# Patient Record
Sex: MALE | Race: WHITE | URBAN - METROPOLITAN AREA
[De-identification: names, ages, dates, MRNs, and addresses within clinical notes are randomized per-mention and may not be internally consistent; named-entity substitution may affect disease eponyms.]

---

## 2017-08-09 NOTE — PROGRESS NOTES
SUBJECTIVE:   CC: Ezra Murguia is an 27 year old male who presents for preventative health visit.     Healthy Habits:    Do you get at least three servings of calcium containing foods daily (dairy, green leafy vegetables, etc.)? yes    Amount of exercise or daily activities, outside of work: 2-2 day(s) per week    Problems taking medications regularly not applicable    Medication side effects: No    Have you had an eye exam in the past two years? no    Do you see a dentist twice per year? yes  Do you have sleep apnea, excessive snoring or daytime drowsiness?no     Additional Concerns:     Neck Pain  Duration of complaint: 2 month; tightness in neck muscle.    Denies any injury to neck.  No previous issue with neck.   Works as a . Tried OTC tylenol/ibuprofen.       Patient informed that anything we discuss that is not related to preventative medicine, may be billed for; patient verbalizes understanding.        Today's PHQ-2 Score:   PHQ-2 ( 1999 Pfizer) 8/9/2017   Q1: Little interest or pleasure in doing things 0   Q2: Feeling down, depressed or hopeless 0   PHQ-2 Score 0         Abuse: Current or Past(Physical, Sexual or Emotional)- No  Do you feel safe in your environment - Yes  Social History   Substance Use Topics     Smoking status: Current Every Day Smoker     Types: Cigarettes     Smokeless tobacco: Never Used     Alcohol use Yes     The patient does not drink >3 drinks per day nor >7 drinks per week.    Last PSA: No results found for: PSA    Reviewed orders with patient. Reviewed health maintenance and updated orders accordingly - Yes  Patient Active Problem List   Diagnosis     Elevated blood pressure reading without diagnosis of hypertension     Cervicalgia     Tobacco use     Past Surgical History:   Procedure Laterality Date     BACK SURGERY  02/2016       Social History   Substance Use Topics     Smoking status: Current Every Day Smoker     Types: Cigarettes     Smokeless tobacco: Never  "Used     Alcohol use Yes     Family History   Problem Relation Age of Onset     Colon Cancer No family hx of      Prostate Cancer No family hx of          Current Outpatient Prescriptions   Medication Sig Dispense Refill     cyclobenzaprine (FLEXERIL) 5 MG tablet Take 1 tablet (5 mg) by mouth 3 times daily as needed for muscle spasms Avoid driving or operating machinery while on this medication-has drowsy effects. 42 tablet 0     No Known Allergies      Reviewed and updated as needed this visit by clinical staffTobacco  Allergies  Meds  Soc Hx        Reviewed and updated as needed this visit by Provider        Past Medical History:   Diagnosis Date     Back pain, chronic     s/p back surgery     Tobacco use       Past Surgical History:   Procedure Laterality Date     BACK SURGERY  02/2016       ROS:  C: NEGATIVE for fever, chills, change in weight  I: NEGATIVE for worrisome rashes, moles or lesions  E: NEGATIVE for vision changes or irritation  ENT: NEGATIVE for ear, mouth and throat problems  R: NEGATIVE for significant cough or SOB  CV: NEGATIVE for chest pain, palpitations or peripheral edema  GI: NEGATIVE for nausea, abdominal pain, heartburn, or change in bowel habits   male: negative for dysuria, hematuria, decreased urinary stream, erectile dysfunction, urethral discharge  M: NEGATIVE for significant arthralgias or myalgia  N: NEGATIVE for weakness, dizziness or paresthesias  P: NEGATIVE for changes in mood or affect    OBJECTIVE:   /82  Pulse 78  Temp 97.5  F (36.4  C) (Tympanic)  Ht 6' 2\" (1.88 m)  Wt 170 lb 6.4 oz (77.3 kg)  SpO2 100%  BMI 21.88 kg/m2  EXAM:  GENERAL: healthy, alert and no distress  EYES: Eyes grossly normal to inspection, PERRL and conjunctivae and sclerae normal  HENT: ear canals and TM's normal, nose and mouth without ulcers or lesions  NECK: no adenopathy, no asymmetry, masses, or scars and thyroid normal to palpation  RESP: lungs clear to auscultation - no rales, " rhonchi or wheezes  CV: regular rate and rhythm, normal S1 S2, no S3 or S4, no murmur, click or rub, no peripheral edema and peripheral pulses strong  ABDOMEN: soft, nontender, no hepatosplenomegaly, no masses and bowel sounds normal  MS: no gross musculoskeletal defects noted, no edema  SKIN: no suspicious lesions or rashes  NEURO: Normal strength and tone, mentation intact and speech normal  PSYCH: mentation appears normal, affect normal/bright    ASSESSMENT/PLAN:   Ezra was seen today for physical.    Diagnoses and all orders for this visit:    Routine general medical examination at a health care facility    Cervicalgia  -     cyclobenzaprine (FLEXERIL) 5 MG tablet; Take 1 tablet (5 mg) by mouth 3 times daily as needed for muscle spasms Avoid driving or operating machinery while on this medication-has drowsy effects.  -    If persistent or worsening will obtain imaging studies to further evaluate.   -  Offered PT, patient declined at this time.     Lipid screening  -     Lipid Profile; Future    Screening for diabetes mellitus  -     Glucose; Future    Elevated blood pressure reading without diagnosis of hypertension  Repeat BP at the end of the visit was within goal. Continue to monitor.   Repeat BP check in 2-4 weeks (ancillary)    Tobacco use  Tobacco Cessation Action Plan: Information offered: Patient not interested at this time        COUNSELING:  Reviewed preventive health counseling, as reflected in patient instructions       Regular exercise       Healthy diet/nutrition    BP Screening:   Last 3 BP Readings:    BP Readings from Last 3 Encounters:   08/11/17 132/82       The following was recommended to the patient:  Re-screen within 4 weeks and recommend lifestyle modifications       reports that he has been smoking Cigarettes.  He has never used smokeless tobacco.  Tobacco Cessation Action Plan: Information offered: Patient not interested at this time  Estimated body mass index is 21.88 kg/(m^2) as  "calculated from the following:    Height as of this encounter: 6' 2\" (1.88 m).    Weight as of this encounter: 170 lb 6.4 oz (77.3 kg).         Counseling Resources:  ATP IV Guidelines  Pooled Cohorts Equation Calculator  FRAX Risk Assessment  ICSI Preventive Guidelines  Dietary Guidelines for Americans, 2010  USDA's MyPlate  ASA Prophylaxis  Lung CA Screening    Follow up annually and as needed thoughout the year.    Rachel Lion MD  [facility]  "

## 2017-08-11 ENCOUNTER — OFFICE VISIT (OUTPATIENT)
Dept: FAMILY MEDICINE | Facility: CLINIC | Age: 27
End: 2017-08-11
Payer: COMMERCIAL

## 2017-08-11 VITALS
OXYGEN SATURATION: 100 % | TEMPERATURE: 97.5 F | SYSTOLIC BLOOD PRESSURE: 132 MMHG | HEART RATE: 78 BPM | WEIGHT: 170.4 LBS | HEIGHT: 74 IN | BODY MASS INDEX: 21.87 KG/M2 | DIASTOLIC BLOOD PRESSURE: 82 MMHG

## 2017-08-11 DIAGNOSIS — R03.0 ELEVATED BLOOD PRESSURE READING WITHOUT DIAGNOSIS OF HYPERTENSION: ICD-10-CM

## 2017-08-11 DIAGNOSIS — Z72.0 TOBACCO USE: ICD-10-CM

## 2017-08-11 DIAGNOSIS — Z13.1 SCREENING FOR DIABETES MELLITUS: ICD-10-CM

## 2017-08-11 DIAGNOSIS — Z00.00 ROUTINE GENERAL MEDICAL EXAMINATION AT A HEALTH CARE FACILITY: Primary | ICD-10-CM

## 2017-08-11 DIAGNOSIS — M54.2 CERVICALGIA: ICD-10-CM

## 2017-08-11 DIAGNOSIS — Z13.220 LIPID SCREENING: ICD-10-CM

## 2017-08-11 PROCEDURE — 99213 OFFICE O/P EST LOW 20 MIN: CPT | Mod: 25 | Performed by: FAMILY MEDICINE

## 2017-08-11 PROCEDURE — 99385 PREV VISIT NEW AGE 18-39: CPT | Performed by: FAMILY MEDICINE

## 2017-08-11 RX ORDER — CYCLOBENZAPRINE HCL 5 MG
5 TABLET ORAL 3 TIMES DAILY PRN
Qty: 42 TABLET | Refills: 0 | Status: SHIPPED | OUTPATIENT
Start: 2017-08-11 | End: 2017-09-27

## 2017-08-11 NOTE — NURSING NOTE
"Chief Complaint   Patient presents with     Physical       Initial /85  Pulse 78  Temp 97.5  F (36.4  C) (Tympanic)  Ht 6' 2\" (1.88 m)  Wt 170 lb 6.4 oz (77.3 kg)  SpO2 100%  BMI 21.88 kg/m2 Estimated body mass index is 21.88 kg/(m^2) as calculated from the following:    Height as of this encounter: 6' 2\" (1.88 m).    Weight as of this encounter: 170 lb 6.4 oz (77.3 kg).  Medication Reconciliation: complete     Ebonie Hutchison MA      "

## 2017-08-11 NOTE — LETTER
[facility]  [address]  [place] [address]  [phone]        August 21, 2018    Ezra Murguia  [address]  [place] [address]              Dear Ezra Murguia    This is to remind you that your Lipid profile is due.    You may call our office at [phone] to schedule an appointment.    Please disregard this notice if you have already had your labs drawn or made an appointment.        Sincerely,        Rachel Lion MD

## 2017-08-11 NOTE — MR AVS SNAPSHOT
After Visit Summary   8/11/2017    Ezra Murguia    MRN: [id number]           Patient Information     Date Of Birth          1990        Visit Information        Provider Department      8/11/2017 4:30 PM Rachel Lion MD [facility] [place]        Today's Diagnoses     Routine general medical examination at a health care facility    -  1    Cervicalgia        Lipid screening        Screening for diabetes mellitus        Elevated blood pressure reading without diagnosis of hypertension          Care Instructions      Preventive Health Recommendations  Male Ages 26 - 39    Yearly exam:             See your health care provider every year in order to  o   Review health changes.   o   Discuss preventive care.    o   Review your medicines if your doctor has prescribed any.    You should be tested each year for STDs (sexually transmitted diseases), if you re at risk.     After age 35, talk to your provider about cholesterol testing. If you are at risk for heart disease, have your cholesterol tested at least every 5 years.     If you are at risk for diabetes, you should have a diabetes test (fasting glucose).  Shots: Get a flu shot each year. Get a tetanus shot every 10 years.     Nutrition:    Eat at least 5 servings of fruits and vegetables daily.     Eat whole-grain bread, whole-wheat pasta and brown rice instead of white grains and rice.     Talk to your provider about Calcium and Vitamin D.     Lifestyle    Exercise for at least 150 minutes a week (30 minutes a day, 5 days a week). This will help you control your weight and prevent disease.     Limit alcohol to one drink per day.     No smoking.     Wear sunscreen to prevent skin cancer.     See your dentist every six months for an exam and cleaning.             Follow-ups after your visit        Follow-up notes from your care team     Return in about 2 weeks (around 8/25/2017), or if symptoms worsen or fail to improve, for BP Recheck.     "  Future tests that were ordered for you today     Open Future Orders        Priority Expected Expires Ordered    Lipid Profile Routine  2018    Glucose Routine  2018            Who to contact     Normal or non-critical lab and imaging results will be communicated to you by WoraPayhart, letter or phone within 4 business days after the clinic has received the results. If you do not hear from us within 7 days, please contact the clinic through MyChart or phone. If you have a critical or abnormal lab result, we will notify you by phone as soon as possible.  Submit refill requests through Edsby or call your pharmacy and they will forward the refill request to us. Please allow 3 business days for your refill to be completed.          If you need to speak with a  for additional information , please call: [phone]             Additional Information About Your Visit        Edsby Information     Edsby lets you send messages to your doctor, view your test results, renew your prescriptions, schedule appointments and more. To sign up, go to [email or web address] . Click on \"Log in\" on the left side of the screen, which will take you to the Welcome page. Then click on \"Sign up Now\" on the right side of the page.     You will be asked to enter the access code listed below, as well as some personal information. Please follow the directions to create your username and password.     Your access code is: [email or web address]  Expires: 2017  5:04 PM     Your access code will  in 90 days. If you need help or a new code, please call your [place] clinic or [phone].        Care EveryWhere ID     This is your Care EveryWhere ID. This could be used by other organizations to access your [place] medical records  [id number]        Your Vitals Were     Pulse Temperature Height Pulse Oximetry BMI (Body Mass Index)       78 97.5  F (36.4  C) (Tympanic) 6' 2\" (1.88 m) 100% 21.88 " kg/m2        Blood Pressure from Last 3 Encounters:   08/11/17 146/85    Weight from Last 3 Encounters:   08/11/17 170 lb 6.4 oz (77.3 kg)                 Today's Medication Changes          These changes are accurate as of: 8/11/17  5:04 PM.  If you have any questions, ask your nurse or doctor.               Start taking these medicines.        Dose/Directions    cyclobenzaprine 5 MG tablet   Commonly known as:  FLEXERIL   Used for:  Cervicalgia   Started by:  Rachel Lion MD        Dose:  5 mg   Take 1 tablet (5 mg) by mouth 3 times daily as needed for muscle spasms Avoid driving or operating machinery while on this medication-has drowsy effects.   Quantity:  42 tablet   Refills:  0            Where to get your medicines      Some of these will need a paper prescription and others can be bought over the counter.  Ask your nurse if you have questions.     Bring a paper prescription for each of these medications     cyclobenzaprine 5 MG tablet                Primary Care Provider Office Phone #    [facility] [phone]       No address on file        Equal Access to Services     MARKOS GRAHAM : Hadii og ku hadasho Soomaali, waaxda luqadaha, qaybta kaalmada adeegyada, waxay rajani valencia . So [facility] [phone].    ATENCIÓN: Si habla español, tiene a valdez disposición servicios gratuitos de asistencia lingüística. Llame al [phone].    We comply with applicable federal civil rights laws and [place] laws. We do not discriminate on the basis of race, color, national origin, age, disability sex, sexual orientation or gender identity.            Thank you!     Thank you for choosing [facility]  for your care. Our goal is always to provide you with excellent care. Hearing back from our patients is one way we can continue to improve our services. Please take a few minutes to complete the written survey that you may receive in the mail after your visit with us. Thank  you!             Your Updated Medication List - Protect others around you: Learn how to safely use, store and throw away your medicines at www.disposemymeds.org.          This list is accurate as of: 8/11/17  5:04 PM.  Always use your most recent med list.                   Brand Name Dispense Instructions for use Diagnosis    cyclobenzaprine 5 MG tablet    FLEXERIL    42 tablet    Take 1 tablet (5 mg) by mouth 3 times daily as needed for muscle spasms Avoid driving or operating machinery while on this medication-has drowsy effects.    Cervicalgia

## 2017-09-27 ENCOUNTER — OFFICE VISIT (OUTPATIENT)
Dept: FAMILY MEDICINE | Facility: CLINIC | Age: 27
End: 2017-09-27
Payer: COMMERCIAL

## 2017-09-27 ENCOUNTER — RADIANT APPOINTMENT (OUTPATIENT)
Dept: GENERAL RADIOLOGY | Facility: CLINIC | Age: 27
End: 2017-09-27
Attending: FAMILY MEDICINE
Payer: COMMERCIAL

## 2017-09-27 VITALS
TEMPERATURE: 97.8 F | BODY MASS INDEX: 21.28 KG/M2 | WEIGHT: 165.8 LBS | HEART RATE: 83 BPM | DIASTOLIC BLOOD PRESSURE: 82 MMHG | HEIGHT: 74 IN | OXYGEN SATURATION: 97 % | SYSTOLIC BLOOD PRESSURE: 130 MMHG

## 2017-09-27 DIAGNOSIS — M54.2 CERVICALGIA: Primary | ICD-10-CM

## 2017-09-27 DIAGNOSIS — R03.0 ELEVATED BLOOD PRESSURE READING WITHOUT DIAGNOSIS OF HYPERTENSION: ICD-10-CM

## 2017-09-27 PROCEDURE — 72040 X-RAY EXAM NECK SPINE 2-3 VW: CPT

## 2017-09-27 PROCEDURE — 99214 OFFICE O/P EST MOD 30 MIN: CPT | Performed by: FAMILY MEDICINE

## 2017-09-27 RX ORDER — DICLOFENAC SODIUM 75 MG/1
75 TABLET, DELAYED RELEASE ORAL 2 TIMES DAILY PRN
Qty: 30 TABLET | Refills: 0 | Status: SHIPPED | OUTPATIENT
Start: 2017-09-27 | End: 2017-11-30

## 2017-09-27 RX ORDER — CYCLOBENZAPRINE HCL 10 MG
10 TABLET ORAL 3 TIMES DAILY PRN
Qty: 60 TABLET | Refills: 0 | Status: SHIPPED | OUTPATIENT
Start: 2017-09-27 | End: 2017-11-30

## 2017-09-27 NOTE — MR AVS SNAPSHOT
"              After Visit Summary   2017    Ezra Murguia    MRN: [id number]           Patient Information     Date Of Birth          1990        Visit Information        Provider Department      2017 5:30 PM Rachel Lion MD [facility] [place]        Today's Diagnoses     Cervicalgia    -  1    Elevated blood pressure reading without diagnosis of hypertension           Follow-ups after your visit        Follow-up notes from your care team     Return if symptoms worsen or fail to improve.      Who to contact     Normal or non-critical lab and imaging results will be communicated to you by Wallophart, letter or phone within 4 business days after the clinic has received the results. If you do not hear from us within 7 days, please contact the clinic through Wallophart or phone. If you have a critical or abnormal lab result, we will notify you by phone as soon as possible.  Submit refill requests through [email or web address] or call your pharmacy and they will forward the refill request to us. Please allow 3 business days for your refill to be completed.          If you need to speak with a  for additional information , please call: [phone]             Additional Information About Your Visit        MyCharHotalot Information     [email or web address] lets you send messages to your doctor, view your test results, renew your prescriptions, schedule appointments and more. To sign up, go to [email or web address] . Click on \"Log in\" on the left side of the screen, which will take you to the Welcome page. Then click on \"Sign up Now\" on the right side of the page.     You will be asked to enter the access code listed below, as well as some personal information. Please follow the directions to create your username and password.     Your access code is: [email or web address]  Expires: 2017  5:04 PM     Your access code will  in 90 days. If you need help or a new code, please call your [place] clinic or [phone].   " "     Care EveryWhere ID     This is your Care EveryWhere ID. This could be used by other organizations to access your [place] medical records  [id number]        Your Vitals Were     Pulse Temperature Height Pulse Oximetry BMI (Body Mass Index)       83 97.8  F (36.6  C) (Tympanic) 6' 2\" (1.88 m) 97% 21.29 kg/m2        Blood Pressure from Last 3 Encounters:   09/27/17 147/84   08/11/17 132/82    Weight from Last 3 Encounters:   09/27/17 165 lb 12.8 oz (75.2 kg)   08/11/17 170 lb 6.4 oz (77.3 kg)              We Performed the Following     XR Cervical Spine 2/3 Views          Today's Medication Changes          These changes are accurate as of: 9/27/17  5:47 PM.  If you have any questions, ask your nurse or doctor.               Start taking these medicines.        Dose/Directions    diclofenac 75 MG EC tablet   Commonly known as:  VOLTAREN   Used for:  Cervicalgia   Started by:  Rachel Lion MD        Dose:  75 mg   Take 1 tablet (75 mg) by mouth 2 times daily as needed for moderate pain (take with food)   Quantity:  30 tablet   Refills:  0            Where to get your medicines      These medications were sent to [facility] - [id number]  [id number] [place], [place] [address] [address]     Phone:  [phone]     diclofenac 75 MG EC tablet                Primary Care Provider Office Phone # Fax #    Rachel Lion [phone] [address]  [place] [address]        Equal Access to Services     ASHLEY GRAHAM AH: Hadii og delong hadlottieo Soalma, waaxda luqadaha, qaybta kaalmada rony, beverly richardson. So [facility] [phone].    ATENCIÓN: Si habla español, tiene a valdez disposición servicios gratuitos de asistencia lingüística. Llame al [phone].    We comply with applicable federal civil rights laws and [place] laws. We do not discriminate on the basis of race, color, national origin, age, disability sex, sexual " orientation or gender identity.            Thank you!     Thank you for choosing [facility]  for your care. Our goal is always to provide you with excellent care. Hearing back from our patients is one way we can continue to improve our services. Please take a few minutes to complete the written survey that you may receive in the mail after your visit with us. Thank you!             Your Updated Medication List - Protect others around you: Learn how to safely use, store and throw away your medicines at www.disposemymeds.org.          This list is accurate as of: 9/27/17  5:47 PM.  Always use your most recent med list.                   Brand Name Dispense Instructions for use Diagnosis    cyclobenzaprine 5 MG tablet    FLEXERIL    42 tablet    Take 1 tablet (5 mg) by mouth 3 times daily as needed for muscle spasms Avoid driving or operating machinery while on this medication-has drowsy effects.    Cervicalgia       diclofenac 75 MG EC tablet    VOLTAREN    30 tablet    Take 1 tablet (75 mg) by mouth 2 times daily as needed for moderate pain (take with food)    Cervicalgia

## 2017-09-27 NOTE — NURSING NOTE
"Chief Complaint   Patient presents with     Neck Pain       Initial /84  Pulse 83  Temp 97.8  F (36.6  C) (Tympanic)  Ht 6' 2\" (1.88 m)  Wt 165 lb 12.8 oz (75.2 kg)  SpO2 97%  BMI 21.29 kg/m2 Estimated body mass index is 21.29 kg/(m^2) as calculated from the following:    Height as of this encounter: 6' 2\" (1.88 m).    Weight as of this encounter: 165 lb 12.8 oz (75.2 kg).  Medication Reconciliation: complete     Ebonie Hutchison MA      "

## 2017-09-27 NOTE — PROGRESS NOTES
SUBJECTIVE:   Ezra Murguia is a 27 year old male who presents to clinic today for the following health issues:      Neck Pain  Onset: 3 months    Description:   Location: right side mostly, still having some tightness in left side  Radiation: with stretching he can feel it move to shoulders.     Intensity: 4/10 currently; 7/10 at it's worse     Progression of Symptoms:  Same; feels like a tightness or stiffness in neck.     Accompanying Signs & Symptoms:  Burning, prickly sensation (paresthesias) in arm(s): no   Numbness in arm(s): no   Weakness in arm(s):  no   Fever: no   Headache: no   Nausea and/or vomiting: no     History:   Trauma: no   Previous neck pain: no   Previous surgery or injections: no   Previous Imaging (MRI,X ray): no     Precipitating factors:   Does movement increase the pain:  YES    Alleviating factors:  none    Therapies Tried and outcome:  none      Seen in the clinic recently with similar symptoms, states that the Flexeril was minimally effective.     Problem list and histories reviewed & adjusted, as indicated.  Additional history: as documented    Patient Active Problem List   Diagnosis     Elevated blood pressure reading without diagnosis of hypertension     Cervicalgia     Tobacco use     Past Surgical History:   Procedure Laterality Date     BACK SURGERY  02/2016       Social History   Substance Use Topics     Smoking status: Current Every Day Smoker     Types: Cigarettes     Smokeless tobacco: Never Used     Alcohol use Yes     Family History   Problem Relation Age of Onset     Colon Cancer No family hx of      Prostate Cancer No family hx of          Current Outpatient Prescriptions   Medication Sig Dispense Refill     diclofenac (VOLTAREN) 75 MG EC tablet Take 1 tablet (75 mg) by mouth 2 times daily as needed for moderate pain (take with food) 30 tablet 0     cyclobenzaprine (FLEXERIL) 5 MG tablet Take 1 tablet (5 mg) by mouth 3 times daily as needed for muscle spasms Avoid driving  "or operating machinery while on this medication-has drowsy effects. 42 tablet 0     No Known Allergies      Reviewed and updated as needed this visit by clinical staff     Reviewed and updated as needed this visit by Provider         ROS:  Constitutional, HEENT, cardiovascular, pulmonary, gi and gu systems are negative, except as otherwise noted.      OBJECTIVE:   /82  Pulse 83  Temp 97.8  F (36.6  C) (Tympanic)  Ht 6' 2\" (1.88 m)  Wt 165 lb 12.8 oz (75.2 kg)  SpO2 97%  BMI 21.29 kg/m2  Body mass index is 21.29 kg/(m^2).  GENERAL: healthy, alert and no distress  NECK: no adenopathy, no asymmetry, masses, or scars and thyroid normal to palpation. Tenderness over the right Sternocleidomastoid muscle.    Diagnostic Test Results:  X ray in process    ASSESSMENT/PLAN:     Ezra was seen today for neck pain.    Diagnoses and all orders for this visit:    Cervicalgia, likely cervical strain. Rule out degenerative cervical spine changes.   -     XR Cervical Spine 2/3 Views  -     Start: diclofenac (VOLTAREN) 75 MG EC tablet; Take 1 tablet (75 mg) by mouth 2 times daily as needed for moderate pain (take with food)  - Continue the Flexeril as needed. cyclobenzaprine (FLEXERIL) 10 MG tablet; Take 1 tablet (10 mg) by mouth 3 times daily as needed for muscle spasms Note: has drowsy effects, avoid driving/operating machinery.  -  Declined PT/Chiropractor referral         Elevated blood pressure reading without diagnosis of hypertension  Repeat BP was within goal. Continue to monitor.     Will notify him with X ray results.     Follow up if symptoms fail to improve or worsen.      The patient was in agreement with the plan today and had no questions or concerns prior to leaving the clinic.      Rachel Lion MD  [facility]  "

## 2017-10-03 ENCOUNTER — TELEPHONE (OUTPATIENT)
Dept: FAMILY MEDICINE | Facility: CLINIC | Age: 27
End: 2017-10-03

## 2017-10-03 DIAGNOSIS — M54.2 CERVICALGIA: Primary | ICD-10-CM

## 2017-10-04 NOTE — TELEPHONE ENCOUNTER
Neck X ray was normal.   Pain is likely due cervical strain (muscle strain of the neck muscles)  Recommend Physical Therapy in addition to the the Flexeril and NSAID.   Referral completed.

## 2017-10-04 NOTE — TELEPHONE ENCOUNTER
Spoke with patient, advised xray was read as normal. Patient is still having neck pain wondering what neck step would be. Please advise

## 2017-10-05 NOTE — TELEPHONE ENCOUNTER
Patient returned call, informed as below per Dr. Lion. Patient understood no further questions or concerns

## 2017-11-30 ENCOUNTER — OFFICE VISIT (OUTPATIENT)
Dept: FAMILY MEDICINE | Facility: CLINIC | Age: 27
End: 2017-11-30
Payer: COMMERCIAL

## 2017-11-30 VITALS
SYSTOLIC BLOOD PRESSURE: 139 MMHG | RESPIRATION RATE: 16 BRPM | HEART RATE: 91 BPM | BODY MASS INDEX: 23.1 KG/M2 | OXYGEN SATURATION: 97 % | WEIGHT: 180 LBS | TEMPERATURE: 98 F | DIASTOLIC BLOOD PRESSURE: 85 MMHG | HEIGHT: 74 IN

## 2017-11-30 DIAGNOSIS — M54.50 ACUTE RIGHT-SIDED LOW BACK PAIN WITHOUT SCIATICA: Primary | ICD-10-CM

## 2017-11-30 PROCEDURE — 99213 OFFICE O/P EST LOW 20 MIN: CPT | Performed by: PHYSICIAN ASSISTANT

## 2017-11-30 RX ORDER — METHOCARBAMOL 500 MG/1
500-1000 TABLET, FILM COATED ORAL 3 TIMES DAILY PRN
Qty: 30 TABLET | Refills: 1 | Status: SHIPPED | OUTPATIENT
Start: 2017-11-30 | End: 2018-04-17

## 2017-11-30 RX ORDER — PREDNISONE 20 MG/1
20 TABLET ORAL 2 TIMES DAILY
Qty: 14 TABLET | Refills: 0 | Status: SHIPPED | OUTPATIENT
Start: 2017-11-30 | End: 2017-12-07

## 2017-11-30 NOTE — PROGRESS NOTES
SUBJECTIVE:   Ezra Murguia is a 27 year old male who presents to clinic today for the following health issues:      Back Pain       Duration: yesterday        Specific cause: states lifting something at work    Description:   Location of pain: middle of back right  Character of pain: sharp and dull at rest  Pain radiation:none  New numbness or weakness in legs, not attributed to pain:  no     Intensity: Currently 7/10    History:   Pain interferes with job: YES  History of back problems: recurrent self limited episodes of low back pain in the past also has hx of back sx  Any previous MRI or X-rays: Yes--unsure where.    Sees a specialist for back pain:  No  Therapies tried without relief: heat ice tylenol and ibuprofen     Alleviating factors:   Improved by: nothing      Precipitating factors:  Worsened by: Lifting, Bending, Standing, Sitting, Lying Flat and Walking    Functional and Psychosocial Screen (Britt STarT Back):      Not performed today          Accompanying Signs & Symptoms:  Risk of Fracture:  None  Risk of Cauda Equina:  None  Risk of Infection:  None  Risk of Cancer:  None  Risk of Ankylosing Spondylitis:  Onset at age <35, male, AND morning back stiffness. no                       Problem list and histories reviewed & adjusted, as indicated.  Additional history: as documented        Reviewed and updated as needed this visit by clinical staff  Tobacco  Allergies  Meds       Reviewed and updated as needed this visit by Provider         All other systems negative except as outline above  OBJECTIVE:  BACK: Lumbosacral spine area reveals local tenderness.  Painful and reduced LS ROM noted. Straight leg raise is negative . DTR's, motor strength and sensation normal, including heel and toe gait.  Perifpheral pulses are palpable.  Hipes and knees have full range of motion without pain.  No abdominal tenderness, mass or organomegaly.    Ezra was seen today for back pain.    Diagnoses and all orders for  this visit:    Acute right-sided low back pain without sciatica  -     predniSONE (DELTASONE) 20 MG tablet; Take 1 tablet (20 mg) by mouth 2 times daily for 7 days  -     methocarbamol (ROBAXIN) 500 MG tablet; Take 1-2 tablets (500-1,000 mg) by mouth 3 times daily as needed for muscle spasms      Advised supportive and symptomatic treatment.  Follow up with Provider - if condition persists or worsens.

## 2017-11-30 NOTE — MR AVS SNAPSHOT
"              After Visit Summary   2017    Ezra Murguia    MRN: [id number]           Patient Information     Date Of Birth          1990        Visit Information        Provider Department      2017 3:20 PM Felipe Jhaveri PA-C [facility] [place]        Today's Diagnoses     Acute right-sided low back pain without sciatica    -  1       Follow-ups after your visit        Who to contact     Normal or non-critical lab and imaging results will be communicated to you by [employer], letter or phone within 4 business days after the clinic has received the results. If you do not hear from us within 7 days, please contact the clinic through [employer] or phone. If you have a critical or abnormal lab result, we will notify you by phone as soon as possible.  Submit refill requests through [employer] or call your pharmacy and they will forward the refill request to us. Please allow 3 business days for your refill to be completed.          If you need to speak with a  for additional information , please call: [phone]             Additional Information About Your Visit        [employer] Information     [employer] lets you send messages to your doctor, view your test results, renew your prescriptions, schedule appointments and more. To sign up, go to [email or web address] . Click on \"Log in\" on the left side of the screen, which will take you to the Welcome page. Then click on \"Sign up Now\" on the right side of the page.     You will be asked to enter the access code listed below, as well as some personal information. Please follow the directions to create your username and password.     Your access code is: [email or web address]  Expires: 2018  3:47 PM     Your access code will  in 90 days. If you need help or a new code, please call your [facility] clinic or [phone].        Care EveryWhere ID     This is your Care EveryWhere ID. This could be used by other organizations to access your " "[place] medical records  [id number]        Your Vitals Were     Pulse Temperature Respirations Height Pulse Oximetry BMI (Body Mass Index)    91 98  F (36.7  C) (Tympanic) 16 6' 2\" (1.88 m) 97% 23.11 kg/m2       Blood Pressure from Last 3 Encounters:   11/30/17 139/85   09/27/17 130/82   08/11/17 132/82    Weight from Last 3 Encounters:   11/30/17 180 lb (81.6 kg)   09/27/17 165 lb 12.8 oz (75.2 kg)   08/11/17 170 lb 6.4 oz (77.3 kg)              Today, you had the following     No orders found for display         Today's Medication Changes          These changes are accurate as of: 11/30/17  3:47 PM.  If you have any questions, ask your nurse or doctor.               Start taking these medicines.        Dose/Directions    methocarbamol 500 MG tablet   Commonly known as:  ROBAXIN   Used for:  Acute right-sided low back pain without sciatica   Started by:  Felipe Jhaveri PA-C        Dose:  500-1000 mg   Take 1-2 tablets (500-1,000 mg) by mouth 3 times daily as needed for muscle spasms   Quantity:  30 tablet   Refills:  1       predniSONE 20 MG tablet   Commonly known as:  DELTASONE   Used for:  Acute right-sided low back pain without sciatica   Started by:  Felipe Jhaveri PA-C        Dose:  20 mg   Take 1 tablet (20 mg) by mouth 2 times daily for 7 days   Quantity:  14 tablet   Refills:  0         Stop taking these medicines if you haven't already. Please contact your care team if you have questions.     cyclobenzaprine 10 MG tablet   Commonly known as:  FLEXERIL   Stopped by:  Felipe Jhaveri PA-C           diclofenac 75 MG EC tablet   Commonly known as:  VOLTAREN   Stopped by:  Felipe Jhaveri PA-C                Where to get your medicines      These medications were sent to [facility] [place] - [address]  [address] [facility] [address]     Phone:  [phone]     methocarbamol 500 MG tablet    predniSONE 20 MG tablet                Primary Care Provider " Office Phone # Fax #    Rachel Lion [phone]       [address]  [place] [address]        Equal Access to Services     MARKOS GRAHAM : Jeremiah og delong rustyo Soalma, waaxda luqadaha, qaybta kaalmada rony, beverly lópezjaida debra. So [facility] [phone].    ATENCIÓN: Si habla español, tiene a valdez disposición servicios gratuitos de asistencia lingüística. Llame al [phone].    We comply with applicable federal civil rights laws and [place] laws. We do not discriminate on the basis of race, color, national origin, age, disability, sex, sexual orientation, or gender identity.            Thank you!     Thank you for choosing [facility]  for your care. Our goal is always to provide you with excellent care. Hearing back from our patients is one way we can continue to improve our services. Please take a few minutes to complete the written survey that you may receive in the mail after your visit with us. Thank you!             Your Updated Medication List - Protect others around you: Learn how to safely use, store and throw away your medicines at www.disposemymeds.org.          This list is accurate as of: 11/30/17  3:47 PM.  Always use your most recent med list.                   Brand Name Dispense Instructions for use Diagnosis    methocarbamol 500 MG tablet    ROBAXIN    30 tablet    Take 1-2 tablets (500-1,000 mg) by mouth 3 times daily as needed for muscle spasms    Acute right-sided low back pain without sciatica       predniSONE 20 MG tablet    DELTASONE    14 tablet    Take 1 tablet (20 mg) by mouth 2 times daily for 7 days    Acute right-sided low back pain without sciatica

## 2017-11-30 NOTE — LETTER
[facility]  [address]  [place] [address]  Phone: [phone]    November 30, 2017        Ezra Murguia  [address] [facility] [address]          To whom it may concern:    RE: Ezra Murguia    Patient was seen and treated today at our clinic. Please excuse him from work on 12/1/17.    Please contact me for questions or concerns.      Sincerely,        Felipe Jhaveri PA-C

## 2018-02-22 ENCOUNTER — OFFICE VISIT (OUTPATIENT)
Dept: OTOLARYNGOLOGY | Facility: CLINIC | Age: 28
End: 2018-02-22
Payer: COMMERCIAL

## 2018-02-22 VITALS — WEIGHT: 175 LBS | TEMPERATURE: 98.6 F | BODY MASS INDEX: 22.46 KG/M2 | HEIGHT: 74 IN

## 2018-02-22 DIAGNOSIS — J35.01 CHRONIC TONSILLITIS: Primary | ICD-10-CM

## 2018-02-22 PROCEDURE — 99204 OFFICE O/P NEW MOD 45 MIN: CPT | Performed by: OTOLARYNGOLOGY

## 2018-02-22 NOTE — PATIENT INSTRUCTIONS
General Scheduling Information  To schedule your CT/MRI scan, please contact [facility] at [phone] OR [facility] at [phone]    To schedule your Surgery, please contact our Specialty Schedulers at [phone]      ENT Clinic Locations Clinic Hours Telephone Number     [facility]  [address]  [place] [address]     2nd & 4th Thursday:           8:00am - 12:00pm   To schedule/reschedule an appointment with   Dr. Tavares,   please contact our   Specialty Scheduling Department at:     [phone]       [facility]  [address] [place] [address] [place] [address]   Monday:             8:00am -- 4:30 pm      1st, 3rd & 5th Thursday:           8:00am - 12:00pm      Jason [address] [address]   Wednesday:       9:00 -- 4:30 pm

## 2018-02-22 NOTE — PROGRESS NOTES
"ENT Consultation    History of Present Illness - Ezra Murguia is a 27 year old male with tonsil stones. Has had chronic tonsil issues since November. Has a sensation of a foreign body in the back of the throat. Has tried, gargling and water pick with no benefit.     Past Medical History -   Past Medical History:   Diagnosis Date     Back pain, chronic     s/p back surgery     Tobacco use        Current Medications -   Current Outpatient Prescriptions:      methocarbamol (ROBAXIN) 500 MG tablet, Take 1-2 tablets (500-1,000 mg) by mouth 3 times daily as needed for muscle spasms, Disp: 30 tablet, Rfl: 1    Allergies - No Known Allergies    Social History -   Social History     Social History     Marital status: Single     Spouse name: N/A     Number of children: N/A     Years of education: N/A     Social History Main Topics     Smoking status: Current Every Day Smoker     Types: Cigarettes     Smokeless tobacco: Never Used     Alcohol use Yes     Drug use: No     Sexual activity: Yes     Partners: Female     Birth control/ protection: Pill     Other Topics Concern     None     Social History Narrative       Family History -   Family History   Problem Relation Age of Onset     Colon Cancer No family hx of      Prostate Cancer No family hx of        Review of Systems - As per HPI and PMHx, otherwise review of system review of the head and neck negative.    This document serves as a record of the services and decisions personally performed and made by Joesph Tavares MD. It was created on his behalf by Joe Brooks, a trained medical scribe. The creation of this document is based the provider's statements to the medical scribe.  Joe Brooks February 22, 2018 4:15 PM     Physical Exam  Temp 98.6  F (37  C) (Tympanic)  Ht 1.88 m (6' 2\")  Wt 79.4 kg (175 lb)  BMI 22.47 kg/m2  BMI: Body mass index is 22.47 kg/(m^2).    General - The patient is well nourished and well developed, and appears to have good " nutritional status.  Alert and oriented to person and place, answers questions and cooperates with examination appropriately.    Skin - No suspicious lesions or rashes.  Respiration - No respiratory distress.  Head and Face - Normocephalic and atraumatic, with no gross asymmetry noted of the contour of the facial features.  The facial nerve is intact, with strong symmetric movements.    Voice and Breathing - The patient was breathing comfortably without the use of accessory muscles. There was no wheezing, stridor, or stertor.  The patients voice was clear and strong, and had appropriate pitch and quality.    Ears - Bilateral pinna and EACs with normal appearing overlying skin. Tympanic membrane intact with good mobility on pneumatic otoscopy bilaterally. Bony landmarks of the ossicular chain are normal. The tympanic membranes are normal in appearance. No retraction, perforation, or masses.  No fluid or purulence was seen in the external canal or the middle ear.     Eyes - Extraocular movements intact.  Sclera were not icteric or injected, conjunctiva were pink and moist.    Mouth - Examination of the oral cavity showed pink, healthy oral mucosa. No lesions or ulcerations noted.  The tongue was mobile and midline, and the dentition were in good condition.      Throat - The walls of the oropharynx were smooth, pink, moist, symmetric, and had no lesions or ulcerations.  The tonsillar pillars and soft palate were symmetric.  The uvula was midline on elevation. 2 + tonsils with multiple crypts and stones.  They appear fairly red.      Neck - Normal midline excursion of the laryngotracheal complex during swallowing.  Full range of motion on passive movement.  Palpation of the occipital, submental, submandibular, internal jugular chain, and supraclavicular nodes did not demonstrate any abnormal lymph nodes or masses.  The carotid pulse was palpable bilaterally.  Palpation of the thyroid was soft and smooth, with no nodules  or goiter appreciated.  The trachea was mobile and midline.    Nose - External contour is symmetric, no gross deflection or scars.  Nasal mucosa is pink and moist with no abnormal mucus.  The septum was midline and non-obstructive, turbinates of normal size and position.  No polyps, masses, or purulence noted on examination.    Neuro - Nonfocal neuro exam is normal, CN 2 through 12 intact, normal gait and muscle tone.    Performed in clinic today:  No procedures preformed in clinic today      A/P - Ezra Murguia is a 27 year old male with chronic tonsillitis and tonsilloliths.       Based on the physical exam and history, my recommendation is for tonsillectomy (with or without adenoidectomy).  The remainder of the visit was spent discussing the risks and benefits of tonsillectomy.      These included:The risks of general anesthesia, bleeding, infection, possible need for emergency surgery to control bleeding, possible alteration of speech and swallowing, and even the possibility of continued throat problems following surgery.They understood and wished to call in and schedule. To be scheduled in [place]    Joesph Tavares MD    The information in this document, created by the medical scribe for me, accurately reflects the services I personally performed and the decisions made by me. I have reviewed and approved this document for accuracy prior to leaving the patient care area.  Joesph Tavares MD  4:15 PM, 02/22/18

## 2018-02-22 NOTE — LETTER
2/22/2018         RE: Ezra Murguia  [address]  [facility] [address]        Dear Colleague,    Thank you for referring your patient, Ezra Murguia, to the [facility]. Please see a copy of my visit note below.    ENT Consultation    History of Present Illness - Ezra Murguia is a 27 year old male with tonsil stones. Has had chronic tonsil issues since November. Has a sensation of a foreign body in the back of the throat. Has tried, gargling and water pick with no benefit.     Past Medical History -   Past Medical History:   Diagnosis Date     Back pain, chronic     s/p back surgery     Tobacco use        Current Medications -   Current Outpatient Prescriptions:      methocarbamol (ROBAXIN) 500 MG tablet, Take 1-2 tablets (500-1,000 mg) by mouth 3 times daily as needed for muscle spasms, Disp: 30 tablet, Rfl: 1    Allergies - No Known Allergies    Social History -   Social History     Social History     Marital status: Single     Spouse name: N/A     Number of children: N/A     Years of education: N/A     Social History Main Topics     Smoking status: Current Every Day Smoker     Types: Cigarettes     Smokeless tobacco: Never Used     Alcohol use Yes     Drug use: No     Sexual activity: Yes     Partners: Female     Birth control/ protection: Pill     Other Topics Concern     None     Social History Narrative       Family History -   Family History   Problem Relation Age of Onset     Colon Cancer No family hx of      Prostate Cancer No family hx of        Review of Systems - As per HPI and PMHx, otherwise review of system review of the head and neck negative.    This document serves as a record of the services and decisions personally performed and made by Joesph Tavares MD. It was created on his behalf by Joe Brooks, a trained medical scribe. The creation of this document is based the provider's statements to the medical scribe.  Joe Brooks February 22, 2018 4:15 PM     Physical  "Exam  Temp 98.6  F (37  C) (Tympanic)  Ht 1.88 m (6' 2\")  Wt 79.4 kg (175 lb)  BMI 22.47 kg/m2  BMI: Body mass index is 22.47 kg/(m^2).    General - The patient is well nourished and well developed, and appears to have good nutritional status.  Alert and oriented to person and place, answers questions and cooperates with examination appropriately.    Skin - No suspicious lesions or rashes.  Respiration - No respiratory distress.  Head and Face - Normocephalic and atraumatic, with no gross asymmetry noted of the contour of the facial features.  The facial nerve is intact, with strong symmetric movements.    Voice and Breathing - The patient was breathing comfortably without the use of accessory muscles. There was no wheezing, stridor, or stertor.  The patients voice was clear and strong, and had appropriate pitch and quality.    Ears - Bilateral pinna and EACs with normal appearing overlying skin. Tympanic membrane intact with good mobility on pneumatic otoscopy bilaterally. Bony landmarks of the ossicular chain are normal. The tympanic membranes are normal in appearance. No retraction, perforation, or masses.  No fluid or purulence was seen in the external canal or the middle ear.     Eyes - Extraocular movements intact.  Sclera were not icteric or injected, conjunctiva were pink and moist.    Mouth - Examination of the oral cavity showed pink, healthy oral mucosa. No lesions or ulcerations noted.  The tongue was mobile and midline, and the dentition were in good condition.      Throat - The walls of the oropharynx were smooth, pink, moist, symmetric, and had no lesions or ulcerations.  The tonsillar pillars and soft palate were symmetric.  The uvula was midline on elevation. 2 + tonsils with multiple crypts and stones.  They appear fairly red.      Neck - Normal midline excursion of the laryngotracheal complex during swallowing.  Full range of motion on passive movement.  Palpation of the occipital, submental, " submandibular, internal jugular chain, and supraclavicular nodes did not demonstrate any abnormal lymph nodes or masses.  The carotid pulse was palpable bilaterally.  Palpation of the thyroid was soft and smooth, with no nodules or goiter appreciated.  The trachea was mobile and midline.    Nose - External contour is symmetric, no gross deflection or scars.  Nasal mucosa is pink and moist with no abnormal mucus.  The septum was midline and non-obstructive, turbinates of normal size and position.  No polyps, masses, or purulence noted on examination.    Neuro - Nonfocal neuro exam is normal, CN 2 through 12 intact, normal gait and muscle tone.    Performed in clinic today:  No procedures preformed in clinic today      A/P - Ezra Murguia is a 27 year old male with chronic tonsillitis and tonsilloliths.       Based on the physical exam and history, my recommendation is for tonsillectomy (with or without adenoidectomy).  The remainder of the visit was spent discussing the risks and benefits of tonsillectomy.      These included:The risks of general anesthesia, bleeding, infection, possible need for emergency surgery to control bleeding, possible alteration of speech and swallowing, and even the possibility of continued throat problems following surgery.They understood and wished to call in and schedule. To be scheduled in [place]    Joesph Tavares MD    The information in this document, created by the medical scribe for me, accurately reflects the services I personally performed and the decisions made by me. I have reviewed and approved this document for accuracy prior to leaving the patient care area.  Joesph Tavares MD  4:15 PM, 02/22/18    Again, thank you for allowing me to participate in the care of your patient.        Sincerely,        Joesph Tavares MD, MD

## 2018-02-22 NOTE — MR AVS SNAPSHOT
After Visit Summary   2/22/2018    Ezra Murguia    MRN: [id number]           Patient Information     Date Of Birth          1990        Visit Information        Provider Department      2/22/2018 3:30 PM Joesph Tavares MD [facility]        Today's Diagnoses     Chronic tonsillitis    -  1      Care Instructions    General Scheduling Information  To schedule your CT/MRI scan, please contact [facility] at [phone] OR [facility] at [phone]    To schedule your Surgery, please contact our Specialty Schedulers at [phone]      ENT Clinic Locations Clinic Hours Telephone Number     [facility] [place]  [address]  [place] [address]     2nd & 4th Thursday:           8:00am - 12:00pm   To schedule/reschedule an appointment with   Dr. Tavares,   please contact our   Specialty Scheduling Department at:     [phone]       [facility]  [address] [place] [address] [place] [address]   Monday:             8:00am -- 4:30 pm      1st, 3rd & 5th Thursday:           8:00am - 12:00pm      [address] [address]   Wednesday:       9:00 -- 4:30 pm                    Follow-ups after your visit        Who to contact     If you have questions or need follow up information about today's clinic visit or your schedule please contact [facility] directly at [phone].  Normal or non-critical lab and imaging results will be communicated to you by MyChart, letter or phone within 4 business days after the clinic has received the results. If you do not hear from us within 7 days, please contact the clinic through MyChart or phone. If you have a critical or abnormal lab result, we will notify you by phone as soon as possible.  Submit refill requests through HipSwap or call your pharmacy and they will forward the refill request to us. Please allow 3 business days for your refill to be completed.          Additional  "Information About Your Visit        MyChart Information     Topicmarks lets you send messages to your doctor, view your test results, renew your prescriptions, schedule appointments and more. To sign up, go to [email or web address] . Click on \"Log in\" on the left side of the screen, which will take you to the Welcome page. Then click on \"Sign up Now\" on the right side of the page.     You will be asked to enter the access code listed below, as well as some personal information. Please follow the directions to create your username and password.     Your access code is: [email or web address]  Expires: 2018  3:47 PM     Your access code will  in 90 days. If you need help or a new code, please call your [place] clinic or [phone].        Care EveryWhere ID     This is your Care EveryWhere ID. This could be used by other organizations to access your [place] medical records  [id number]        Your Vitals Were     Temperature Height BMI (Body Mass Index)             98.6  F (37  C) (Tympanic) 1.88 m (6' 2\") 22.47 kg/m2          Blood Pressure from Last 3 Encounters:   17 139/85   17 130/82   17 132/82    Weight from Last 3 Encounters:   18 79.4 kg (175 lb)   17 81.6 kg (180 lb)   17 75.2 kg (165 lb 12.8 oz)              Today, you had the following     No orders found for display       Primary Care Provider Office Phone # Fax #    Rachel Lion [phone]       [address] [place] [address]        Equal Access to Services     [facility]: Hadii og Cook, waminda ludaphneadaha, qaybta kaalmada rony, beverly richardson. So [facility] [phone].    ATENCIÓN: Si habla español, tiene a valdez disposición servicios gratuitos de asistencia lingüística. Llame al [phone].    We comply with applicable federal civil rights laws and [place] laws. We do not discriminate on the basis of race, color, national origin, age, " disability, sex, sexual orientation, or gender identity.            Thank you!     Thank you for choosing [facility]  for your care. Our goal is always to provide you with excellent care. Hearing back from our patients is one way we can continue to improve our services. Please take a few minutes to complete the written survey that you may receive in the mail after your visit with us. Thank you!             Your Updated Medication List - Protect others around you: Learn how to safely use, store and throw away your medicines at www.disposemymeds.org.          This list is accurate as of 2/22/18  6:29 PM.  Always use your most recent med list.                   Brand Name Dispense Instructions for use Diagnosis    methocarbamol 500 MG tablet    ROBAXIN    30 tablet    Take 1-2 tablets (500-1,000 mg) by mouth 3 times daily as needed for muscle spasms    Acute right-sided low back pain without sciatica

## 2018-02-23 ENCOUNTER — TELEPHONE (OUTPATIENT)
Dept: OTOLARYNGOLOGY | Facility: CLINIC | Age: 28
End: 2018-02-23

## 2018-02-23 NOTE — TELEPHONE ENCOUNTER
Left message for return call to schedule surgery        Medical Record Number: [id number]   Ezra Murguia   YOB: 1990   Phone: [phone] (home)   Primary Provider: Rachel Lion     Diagnosis and ICD-10 Code:   Chronic Tonsillitis (J35.01)     Surgeon: MEGAN Tavares MD   Surgical Procedure: Tonsillectomy   BMI:  23   Consent signed? No    Date signed: N/A   Hospital: [facility]   In-Patient/ Out-Patient status: Outpatient   Length of surgery: 30 Minutes   Anesthesia: GEN   Implanted Cardiac Device: No     Date of Surgery: To be determined/per patient   When post-op appointment needed: 2 Weeks     Special Requests/Equipment:

## 2018-02-23 NOTE — TELEPHONE ENCOUNTER
Surgery Scheduled    Date of Surgery 4/24   Procedure: tonsillectomy  [facility]  Surgeon: Chaitanya  Type of Anesthesia : General  Pre-op Patient calling [facility]  2 week post op:5/10 with Dr. Chaitanya [place]        Surgery packet mailed to patient's home address. Patients instructed to arrive 1 hours prior to surgery. Patient understood and agrees to plan.    Glory Meléndez  Surgical Scheduler

## 2018-04-17 ENCOUNTER — OFFICE VISIT (OUTPATIENT)
Dept: FAMILY MEDICINE | Facility: CLINIC | Age: 28
End: 2018-04-17
Payer: COMMERCIAL

## 2018-04-17 VITALS
TEMPERATURE: 97.5 F | BODY MASS INDEX: 23.72 KG/M2 | SYSTOLIC BLOOD PRESSURE: 158 MMHG | HEART RATE: 74 BPM | HEIGHT: 74 IN | DIASTOLIC BLOOD PRESSURE: 97 MMHG | OXYGEN SATURATION: 98 % | WEIGHT: 184.8 LBS

## 2018-04-17 DIAGNOSIS — Z01.818 PREOP GENERAL PHYSICAL EXAM: Primary | ICD-10-CM

## 2018-04-17 DIAGNOSIS — J35.01 CHRONIC TONSILLITIS: ICD-10-CM

## 2018-04-17 DIAGNOSIS — R03.0 ELEVATED BLOOD PRESSURE READING WITHOUT DIAGNOSIS OF HYPERTENSION: ICD-10-CM

## 2018-04-17 LAB
ANION GAP SERPL CALCULATED.3IONS-SCNC: 6 MMOL/L (ref 3–14)
BUN SERPL-MCNC: 11 MG/DL (ref 7–30)
CALCIUM SERPL-MCNC: 9.2 MG/DL (ref 8.5–10.1)
CHLORIDE SERPL-SCNC: 103 MMOL/L (ref 94–109)
CO2 SERPL-SCNC: 28 MMOL/L (ref 20–32)
CREAT SERPL-MCNC: 0.87 MG/DL (ref 0.66–1.25)
ERYTHROCYTE [DISTWIDTH] IN BLOOD BY AUTOMATED COUNT: 13 % (ref 10–15)
GFR SERPL CREATININE-BSD FRML MDRD: >90 ML/MIN/1.7M2
GLUCOSE SERPL-MCNC: 85 MG/DL (ref 70–99)
HCT VFR BLD AUTO: 42.2 % (ref 40–53)
HGB BLD-MCNC: 14.3 G/DL (ref 13.3–17.7)
MCH RBC QN AUTO: 29.1 PG (ref 26.5–33)
MCHC RBC AUTO-ENTMCNC: 33.9 G/DL (ref 31.5–36.5)
MCV RBC AUTO: 86 FL (ref 78–100)
PLATELET # BLD AUTO: 214 10E9/L (ref 150–450)
POTASSIUM SERPL-SCNC: 4.4 MMOL/L (ref 3.4–5.3)
RBC # BLD AUTO: 4.92 10E12/L (ref 4.4–5.9)
SODIUM SERPL-SCNC: 137 MMOL/L (ref 133–144)
WBC # BLD AUTO: 6.2 10E9/L (ref 4–11)

## 2018-04-17 PROCEDURE — 85027 COMPLETE CBC AUTOMATED: CPT | Performed by: FAMILY MEDICINE

## 2018-04-17 PROCEDURE — 99214 OFFICE O/P EST MOD 30 MIN: CPT | Performed by: FAMILY MEDICINE

## 2018-04-17 PROCEDURE — 80048 BASIC METABOLIC PNL TOTAL CA: CPT | Performed by: FAMILY MEDICINE

## 2018-04-17 PROCEDURE — 36415 COLL VENOUS BLD VENIPUNCTURE: CPT | Performed by: FAMILY MEDICINE

## 2018-04-17 NOTE — LETTER
April 19, 2018      Ezra Murguia  [address]  [place] [address]        Dear ,    We are writing to inform you of your test results.    Your Pre-op labs were normal.     Resulted Orders   CBC with platelets   Result Value Ref Range    WBC 6.2 4.0 - 11.0 10e9/L    RBC Count 4.92 4.4 - 5.9 10e12/L    Hemoglobin 14.3 13.3 - 17.7 g/dL    Hematocrit 42.2 40.0 - 53.0 %    MCV 86 78 - 100 fl    MCH 29.1 26.5 - 33.0 pg    MCHC 33.9 31.5 - 36.5 g/dL    RDW 13.0 10.0 - 15.0 %    Platelet Count 214 150 - 450 10e9/L   Basic metabolic panel   Result Value Ref Range    Sodium 137 133 - 144 mmol/L    Potassium 4.4 3.4 - 5.3 mmol/L    Chloride 103 94 - 109 mmol/L    Carbon Dioxide 28 20 - 32 mmol/L    Anion Gap 6 3 - 14 mmol/L    Glucose 85 70 - 99 mg/dL    Urea Nitrogen 11 7 - 30 mg/dL    Creatinine 0.87 0.66 - 1.25 mg/dL    GFR Estimate >90 >60 mL/min/1.7m2      Comment:      Non  GFR Calc    GFR Estimate If Black >90 >60 mL/min/1.7m2      Comment:       GFR Calc    Calcium 9.2 8.5 - 10.1 mg/dL       If you have any questions or concerns, please call the clinic at the number listed above.       Sincerely,        Rachel Lion MD/abiel

## 2018-04-17 NOTE — PATIENT INSTRUCTIONS
Before Your Surgery      Call your surgeon if there is any change in your health. This includes signs of a cold or flu (such as a sore throat, runny nose, cough, rash or fever).    Do not smoke, drink alcohol or take over the counter medicine (unless your surgeon or primary care doctor tells you to) for the 24 hours before and after surgery.    If you take prescribed drugs: Follow your doctor s orders about which medicines to take and which to stop until after surgery.    Eating and drinking prior to surgery: follow the instructions from your surgeon    Take a shower or bath the night before surgery. Use the soap your surgeon gave you to gently clean your skin. If you do not have soap from your surgeon, use your regular soap. Do not shave or scrub the surgery site.  Wear clean pajamas and have clean sheets on your bed.     Pre-operative medication management   1.Avoid over the counter vitamins and Fish Oils, Asprin/NSAIDS at least 7-10 days prior to surgery.  2. May take Tylenol as needed for pain/discomfort prior to surgery

## 2018-04-17 NOTE — PROGRESS NOTES
[facility] [place]  [address]  [place] [address]  [phone]  Dept: [phone]    PRE-OP EVALUATION:  Today's date: 2018    Ezra Murguia (: 1990) presents for pre-operative evaluation assessment as requested by Dr. Tavares.  He requires evaluation and anesthesia risk assessment prior to undergoing surgery/procedure for treatment of chronic tonsillitis .    Proposed Surgery/ Procedure: Tonsillectomy  Date of Surgery/ Procedure: 18  Time of Surgery/ Procedure: 9:30 AM  Hospital/Surgical Facility: [facility]  Fax number for surgical facility: N/A  Primary Physician: Rachel Lion MD  Type of Anesthesia Anticipated: General    Patient has a Health Care Directive or Living Will:  NO    1. NO - Do you have a history of heart attack, stroke, stent, bypass or surgery on an artery in the head, neck, heart or legs?  2. NO - Do you ever have any pain or discomfort in your chest?  3. NO - Do you have a history of  Heart Failure?  4. NO - Are you troubled by shortness of breath when: walking on the level, up a slight hill or at night?  5. NO - Do you currently have a cold, bronchitis or other respiratory infection?  6. NO - Do you have a cough, shortness of breath or wheezing?  7. NO - Do you sometimes get pains in the calves of your legs when you walk?  8. NO - Do you or anyone in your family have previous history of blood clots?  9. YES - DO YOU OR DOES ANYONE IN YOUR FAMILY HAVE A SERIOUS BLEEDING PROBLEM SUCH AS PROLONGED BLEEDING FOLLOWING SURGERIES OR CUTS? Brother   10. NO - Have you ever had problems with anemia or been told to take iron pills?  11. NO - Have you had any abnormal blood loss such as black, tarry or bloody stools, or abnormal vaginal bleeding?  12. NO - Have you ever had a blood transfusion?  13. NO - Have you or any of your relatives ever had problems with anesthesia?  14. NO - Do you have sleep apnea, excessive snoring or  daytime drowsiness?  15. NO - Do you have any prosthetic heart valves?  16. NO - Do you have prosthetic joints?  17. NO - Is there any chance that you may be pregnant?      HPI:     HPI related to upcoming procedure:     28 year old pleasant male that is here for a pre-op visit.   Was recently seen at the ENT's office for Chronic tonsillitis and tonsilliths. He agreed to proceed with a Tonsillectomy +/-adenoidectomy.   He states that he understands the risks and benefits of the procedure and wishes to proceed.    Blood pressure is elevated in the clinic today, 158/97.  He has no known history of hypertension.    His feels well and has no chronic medical problems.  Recent illnesses.      See problem list for active medical problems.  Problems all longstanding and stable, except as noted/documented.  See ROS for pertinent symptoms related to these conditions.                                                                                                                                                          .    MEDICAL HISTORY:     Patient Active Problem List    Diagnosis Date Noted     Chronic tonsillitis 04/17/2018     Priority: Medium     Elevated blood pressure reading without diagnosis of hypertension 08/11/2017     Priority: Medium     Cervicalgia 08/11/2017     Priority: Medium     Tobacco use 08/11/2017     Priority: Medium      Past Medical History:   Diagnosis Date     Back pain, chronic     s/p back surgery     Chronic tonsillitis      Tobacco use     quit in 1/2018     Past Surgical History:   Procedure Laterality Date     BACK SURGERY  02/2016     HC TOOTH EXTRACTION W/FORCEP       No current outpatient prescriptions on file.     OTC products: None, except as noted above    No Known Allergies   Latex Allergy: NO    Social History   Substance Use Topics     Smoking status: Former Smoker     Types: Cigarettes     Smokeless tobacco: Never Used     Alcohol use Yes      Comment: occ     History   Drug Use  "No       REVIEW OF SYSTEMS:   Constitutional, neuro, ENT, endocrine, pulmonary, cardiac, gastrointestinal, genitourinary, musculoskeletal, integument and psychiatric systems are negative, except as otherwise noted.    EXAM:   BP (P) 130/82  Pulse 74  Temp 97.5  F (36.4  C) (Tympanic)  Ht 6' 2\" (1.88 m)  Wt 184 lb 12.8 oz (83.8 kg)  SpO2 98%  BMI 23.73 kg/m2    GENERAL APPEARANCE: healthy, alert and no distress     EYES: EOMI,  PERRL     HENT: ear canals and TM's normal and nose and mouth without ulcers or lesions     NECK: no adenopathy, no asymmetry, masses, or scars and thyroid normal to palpation     RESP: lungs clear to auscultation - no rales, rhonchi or wheezes     CV: regular rates and rhythm, normal S1 S2, no S3 or S4 and no murmur, click or rub     ABDOMEN:  soft, nontender, no HSM or masses and bowel sounds normal     MS: extremities normal- no gross deformities noted, no evidence of inflammation in joints, FROM in all extremities.     SKIN: no suspicious lesions or rashes     NEURO: Normal strength and tone, sensory exam grossly normal, mentation intact and speech normal     PSYCH: mentation appears normal. and affect normal/bright     LYMPHATICS: No cervical adenopathy    DIAGNOSTICS:     Labs Drawn and in Process:   Unresulted Labs Ordered in the Past 30 Days of this Admission     No orders found from 2/16/2018 to 4/18/2018.          No results for input(s): HGB, PLT, INR, NA, POTASSIUM, CR, A1C in the last 10262 hours.     IMPRESSION:   Reason for surgery/procedure: Chronic Tonsillitis  Diagnosis/reason for consult: Tonsillectomy    The proposed surgical procedure is considered INTERMEDIATE risk.    REVISED CARDIAC RISK INDEX  The patient has the following serious cardiovascular risks for perioperative complications such as (MI, PE, VFib and 3  AV Block):  No serious cardiac risks  INTERPRETATION: 0 risks: Class I (very low risk - 0.4% complication rate)    The patient has the following " additional risks for perioperative complications:  No identified additional risks      ICD-10-CM    1. Preop general physical exam Z01.818 CBC with platelets     Basic metabolic panel   2. Chronic tonsillitis J35.01    3. Elevated blood pressure reading without diagnosis of hypertension R03.0 Repeat BP at the end of the visit was within goal.   Continue to monitor.        RECOMMENDATIONS:       Cardiovascular Risk  None identified.      Pulmonary Risk  None identified.      --Patient is to take all scheduled medications on the day of surgery EXCEPT for modifications listed below.  N/A  Instructed to avoid OTC multivitamins, ASA and NSAIDs 7-10 days prior to surgery.     APPROVAL GIVEN to proceed with proposed procedure, without further diagnostic evaluation       Signed Electronically by: Rachel Loin MD    Copy of this evaluation report is provided to requesting physician.    [facility] Preop Guidelines    Revised Cardiac Risk Index

## 2018-04-17 NOTE — MR AVS SNAPSHOT
After Visit Summary   4/17/2018    Ezra Murguia    MRN: [id number]           Patient Information     Date Of Birth          1990        Visit Information        Provider Department      4/17/2018 4:30 PM Rachel Lion MD [facility]        Today's Diagnoses     Preop general physical exam    -  1    Chronic tonsillitis        Elevated blood pressure reading without diagnosis of hypertension          Care Instructions      Before Your Surgery      Call your surgeon if there is any change in your health. This includes signs of a cold or flu (such as a sore throat, runny nose, cough, rash or fever).    Do not smoke, drink alcohol or take over the counter medicine (unless your surgeon or primary care doctor tells you to) for the 24 hours before and after surgery.    If you take prescribed drugs: Follow your doctor s orders about which medicines to take and which to stop until after surgery.    Eating and drinking prior to surgery: follow the instructions from your surgeon    Take a shower or bath the night before surgery. Use the soap your surgeon gave you to gently clean your skin. If you do not have soap from your surgeon, use your regular soap. Do not shave or scrub the surgery site.  Wear clean pajamas and have clean sheets on your bed.     Pre-operative medication management   1.Avoid over the counter vitamins and Fish Oils, Asprin/NSAIDS at least 7-10 days prior to surgery.  2. May take Tylenol as needed for pain/discomfort prior to surgery            Follow-ups after your visit        Follow-up notes from your care team     Return in about 1 year (around 4/17/2019) for Physical Exam and as needed throughout the year.      Your next 10 appointments already scheduled     Apr 24, 2018   Procedure with Joesph Tavares MD   [facility] ([facility])    [address] [place] [address]   [phone]           From [address]: Exit at [address] on [address]. Turn right on [address]. Turn left at stoplight on [address]. [facility] will be in view two blocks ahead            May 10, 2018 10:30 AM CDT   [facility] with [facility]   [facility]    [address] [place] [address]  [place] [address] [phone]              Who to contact     Normal or non-critical lab and imaging results will be communicated to you by Okairoshart, letter or phone within 4 business days after the clinic has received the results. If you do not hear from us within 7 days, please contact the clinic through Okairoshart or phone. If you have a critical or abnormal lab result, we will notify you by phone as soon as possible.  Submit refill requests through Blinkit or call your pharmacy and they will forward the refill request to us. Please allow 3 business days for your refill to be completed.          If you need to speak with a  for additional information , please call: [phone]             Additional Information About Your Visit        Blinkit Information     Blinkit lets you send messages to your doctor, view your test results, renew your prescriptions, schedule appointments and more. To sign up, go to [email or web address] . Click on \"Log in\" on the left side of the screen, which will take you to the Welcome page. Then click on \"Sign up Now\" on the right side of the page.     You will be asked to enter the access code listed below, as well as some personal information. Please follow the directions to create your username and password.     Your access code is: [email or web address]  Expires: 2018  4:41 PM     Your access code will  in 90 days. If you need help or a new code, please call your [place] clinic or [phone].        Care EveryWhere ID     This is your Care EveryWhere ID. This could be used by other organizations to access your [place] medical " "records  [id number]        Your Vitals Were     Pulse Temperature Height Pulse Oximetry BMI (Body Mass Index)       74 97.5  F (36.4  C) (Tympanic) 6' 2\" (1.88 m) 98% 23.73 kg/m2        Blood Pressure from Last 3 Encounters:   04/17/18 (!) 158/97   11/30/17 139/85   09/27/17 130/82    Weight from Last 3 Encounters:   04/17/18 184 lb 12.8 oz (83.8 kg)   02/22/18 175 lb (79.4 kg)   11/30/17 180 lb (81.6 kg)              We Performed the Following     Basic metabolic panel     CBC with platelets        Primary Care Provider Office Phone # Fax #    Rachel Lion [phone]       [address] [place] [address]        Equal Access to Services     [facility]: Hadii aad ku hadasho Soomaali, waaxda luqadaha, qaybta kaalmada adeegyada, beverly gillettein haymarco valencia . So [facility] [phone].    ATENCIÓN: Si habla español, tiene a valdez disposición servicios gratuitos de asistencia lingüística. Llame al [phone].    We comply with applicable federal civil rights laws and [place] laws. We do not discriminate on the basis of race, color, national origin, age, disability, sex, sexual orientation, or gender identity.            Thank you!     Thank you for choosing [facility]  for your care. Our goal is always to provide you with excellent care. Hearing back from our patients is one way we can continue to improve our services. Please take a few minutes to complete the written survey that you may receive in the mail after your visit with us. Thank you!             Your Updated Medication List - Protect others around you: Learn how to safely use, store and throw away your medicines at www.disposemymeds.org.      Notice  As of 4/17/2018  4:41 PM    You have not been prescribed any medications.      "

## 2018-04-23 ENCOUNTER — ANESTHESIA EVENT (OUTPATIENT)
Dept: SURGERY | Facility: CLINIC | Age: 28
End: 2018-04-23
Payer: COMMERCIAL

## 2018-04-23 ASSESSMENT — LIFESTYLE VARIABLES: TOBACCO_USE: 1

## 2018-04-23 NOTE — H&P (VIEW-ONLY)
[facility] [place]  [address] [address]  [place] [address]  [phone]  Dept: [phone]    PRE-OP EVALUATION:  Today's date: 2018    Ezra Murguia (: 1990) presents for pre-operative evaluation assessment as requested by Dr. Tavares.  He requires evaluation and anesthesia risk assessment prior to undergoing surgery/procedure for treatment of chronic tonsillitis .    Proposed Surgery/ Procedure: Tonsillectomy  Date of Surgery/ Procedure: 18  Time of Surgery/ Procedure: 9:30 AM  Hospital/Surgical Facility: [facility]  Fax number for surgical facility: N/A  Primary Physician: Rachel Lion MD  Type of Anesthesia Anticipated: General    Patient has a Health Care Directive or Living Will:  NO    1. NO - Do you have a history of heart attack, stroke, stent, bypass or surgery on an artery in the head, neck, heart or legs?  2. NO - Do you ever have any pain or discomfort in your chest?  3. NO - Do you have a history of  Heart Failure?  4. NO - Are you troubled by shortness of breath when: walking on the level, up a slight hill or at night?  5. NO - Do you currently have a cold, bronchitis or other respiratory infection?  6. NO - Do you have a cough, shortness of breath or wheezing?  7. NO - Do you sometimes get pains in the calves of your legs when you walk?  8. NO - Do you or anyone in your family have previous history of blood clots?  9. YES - DO YOU OR DOES ANYONE IN YOUR FAMILY HAVE A SERIOUS BLEEDING PROBLEM SUCH AS PROLONGED BLEEDING FOLLOWING SURGERIES OR CUTS? Brother   10. NO - Have you ever had problems with anemia or been told to take iron pills?  11. NO - Have you had any abnormal blood loss such as black, tarry or bloody stools, or abnormal vaginal bleeding?  12. NO - Have you ever had a blood transfusion?  13. NO - Have you or any of your relatives ever had problems with anesthesia?  14. NO - Do you have sleep apnea, excessive snoring or  daytime drowsiness?  15. NO - Do you have any prosthetic heart valves?  16. NO - Do you have prosthetic joints?  17. NO - Is there any chance that you may be pregnant?      HPI:     HPI related to upcoming procedure:     28 year old pleasant male that is here for a pre-op visit.   Was recently seen at the ENT's office for Chronic tonsillitis and tonsilliths. He agreed to proceed with a Tonsillectomy +/-adenoidectomy.   He states that he understands the risks and benefits of the procedure and wishes to proceed.    Blood pressure is elevated in the clinic today, 158/97.  He has no known history of hypertension.    His feels well and has no chronic medical problems.  Recent illnesses.      See problem list for active medical problems.  Problems all longstanding and stable, except as noted/documented.  See ROS for pertinent symptoms related to these conditions.                                                                                                                                                          .    MEDICAL HISTORY:     Patient Active Problem List    Diagnosis Date Noted     Chronic tonsillitis 04/17/2018     Priority: Medium     Elevated blood pressure reading without diagnosis of hypertension 08/11/2017     Priority: Medium     Cervicalgia 08/11/2017     Priority: Medium     Tobacco use 08/11/2017     Priority: Medium      Past Medical History:   Diagnosis Date     Back pain, chronic     s/p back surgery     Chronic tonsillitis      Tobacco use     quit in 1/2018     Past Surgical History:   Procedure Laterality Date     BACK SURGERY  02/2016     HC TOOTH EXTRACTION W/FORCEP       No current outpatient prescriptions on file.     OTC products: None, except as noted above    No Known Allergies   Latex Allergy: NO    Social History   Substance Use Topics     Smoking status: Former Smoker     Types: Cigarettes     Smokeless tobacco: Never Used     Alcohol use Yes      Comment: occ     History   Drug Use  "No       REVIEW OF SYSTEMS:   Constitutional, neuro, ENT, endocrine, pulmonary, cardiac, gastrointestinal, genitourinary, musculoskeletal, integument and psychiatric systems are negative, except as otherwise noted.    EXAM:   BP (P) 130/82  Pulse 74  Temp 97.5  F (36.4  C) (Tympanic)  Ht 6' 2\" (1.88 m)  Wt 184 lb 12.8 oz (83.8 kg)  SpO2 98%  BMI 23.73 kg/m2    GENERAL APPEARANCE: healthy, alert and no distress     EYES: EOMI,  PERRL     HENT: ear canals and TM's normal and nose and mouth without ulcers or lesions     NECK: no adenopathy, no asymmetry, masses, or scars and thyroid normal to palpation     RESP: lungs clear to auscultation - no rales, rhonchi or wheezes     CV: regular rates and rhythm, normal S1 S2, no S3 or S4 and no murmur, click or rub     ABDOMEN:  soft, nontender, no HSM or masses and bowel sounds normal     MS: extremities normal- no gross deformities noted, no evidence of inflammation in joints, FROM in all extremities.     SKIN: no suspicious lesions or rashes     NEURO: Normal strength and tone, sensory exam grossly normal, mentation intact and speech normal     PSYCH: mentation appears normal. and affect normal/bright     LYMPHATICS: No cervical adenopathy    DIAGNOSTICS:     Labs Drawn and in Process:   Unresulted Labs Ordered in the Past 30 Days of this Admission     No orders found from 2/16/2018 to 4/18/2018.          No results for input(s): HGB, PLT, INR, NA, POTASSIUM, CR, A1C in the last 07797 hours.     IMPRESSION:   Reason for surgery/procedure: Chronic Tonsillitis  Diagnosis/reason for consult: Tonsillectomy    The proposed surgical procedure is considered INTERMEDIATE risk.    REVISED CARDIAC RISK INDEX  The patient has the following serious cardiovascular risks for perioperative complications such as (MI, PE, VFib and 3  AV Block):  No serious cardiac risks  INTERPRETATION: 0 risks: Class I (very low risk - 0.4% complication rate)    The patient has the following " additional risks for perioperative complications:  No identified additional risks      ICD-10-CM    1. Preop general physical exam Z01.818 CBC with platelets     Basic metabolic panel   2. Chronic tonsillitis J35.01    3. Elevated blood pressure reading without diagnosis of hypertension R03.0 Repeat BP at the end of the visit was within goal.   Continue to monitor.        RECOMMENDATIONS:       Cardiovascular Risk  None identified.      Pulmonary Risk  None identified.      --Patient is to take all scheduled medications on the day of surgery EXCEPT for modifications listed below.  N/A  Instructed to avoid OTC multivitamins, ASA and NSAIDs 7-10 days prior to surgery.     APPROVAL GIVEN to proceed with proposed procedure, without further diagnostic evaluation       Signed Electronically by: Rachel Lion MD    Copy of this evaluation report is provided to requesting physician.    [facility] Preop Guidelines    Revised Cardiac Risk Index

## 2018-04-23 NOTE — ANESTHESIA PREPROCEDURE EVALUATION
Anesthesia Evaluation     . Pt has had prior anesthetic. Type: General    No history of anesthetic complications          ROS/MED HX    ENT/Pulmonary:     (+)tobacco use, Past use recently quit packs/day  , . .    Neurologic:  - neg neurologic ROS     Cardiovascular:  - neg cardiovascular ROS   (+) -range: Elevated B/P on routine check 158/97; no diagnosis, ---. : . . . :. . No previous cardiac testing       METS/Exercise Tolerance:  >4 METS   Hematologic:  - neg hematologic  ROS       Musculoskeletal:   (+) , , other musculoskeletal- Cervicalgia; degenerative disc at L5-S1      GI/Hepatic:  - neg GI/hepatic ROS       Renal/Genitourinary:  - ROS Renal section negative       Endo:  - neg endo ROS       Psychiatric:  - neg psychiatric ROS       Infectious Disease:  - neg infectious disease ROS       Malignancy:      - no malignancy   Other:    (+) No chance of pregnancy C-spine cleared: Yes, H/O Chronic Pain,no other significant disability                    Physical Exam  Normal systems: cardiovascular, pulmonary and dental    Airway   Mallampati: II  TM distance: >3 FB  Neck ROM: full    Dental     Cardiovascular   Rhythm and rate: regular and normal      Pulmonary    breath sounds clear to auscultation                    Anesthesia Plan      History & Physical Review  History and physical reviewed and following examination; no interval change.    ASA Status:  2 .    NPO Status:  > 8 hours    Plan for General and ETT with Intravenous and Propofol induction. Maintenance will be Balanced.    PONV prophylaxis:  Ondansetron (or other 5HT-3) and Dexamethasone or Solumedrol       Postoperative Care  Postoperative pain management:  IV analgesics and Oral pain medications.      Consents  Anesthetic plan, risks, benefits and alternatives discussed with:  Patient.  Use of blood products discussed: No .   .

## 2018-04-24 ENCOUNTER — SURGERY (OUTPATIENT)
Age: 28
End: 2018-04-24

## 2018-04-24 ENCOUNTER — HOSPITAL ENCOUNTER (OUTPATIENT)
Facility: CLINIC | Age: 28
Discharge: HOME | End: 2018-04-24
Attending: OTOLARYNGOLOGY | Admitting: OTOLARYNGOLOGY
Payer: COMMERCIAL

## 2018-04-24 ENCOUNTER — ANESTHESIA (OUTPATIENT)
Dept: SURGERY | Facility: CLINIC | Age: 28
End: 2018-04-24
Payer: COMMERCIAL

## 2018-04-24 VITALS
SYSTOLIC BLOOD PRESSURE: 145 MMHG | DIASTOLIC BLOOD PRESSURE: 102 MMHG | TEMPERATURE: 97.9 F | HEIGHT: 74 IN | RESPIRATION RATE: 16 BRPM | OXYGEN SATURATION: 97 % | WEIGHT: 184.8 LBS | BODY MASS INDEX: 23.72 KG/M2

## 2018-04-24 DIAGNOSIS — J35.01 CHRONIC TONSILLITIS: ICD-10-CM

## 2018-04-24 DIAGNOSIS — G89.18 POST-OP PAIN: Primary | ICD-10-CM

## 2018-04-24 DIAGNOSIS — R11.2 POST-OPERATIVE NAUSEA AND VOMITING: ICD-10-CM

## 2018-04-24 DIAGNOSIS — Z98.890 POST-OPERATIVE NAUSEA AND VOMITING: ICD-10-CM

## 2018-04-24 PROCEDURE — 25000128 H RX IP 250 OP 636: Performed by: OTOLARYNGOLOGY

## 2018-04-24 PROCEDURE — 37000008 ZZH ANESTHESIA TECHNICAL FEE, 1ST 30 MIN: Performed by: OTOLARYNGOLOGY

## 2018-04-24 PROCEDURE — 25000132 ZZH RX MED GY IP 250 OP 250 PS 637: Performed by: OTOLARYNGOLOGY

## 2018-04-24 PROCEDURE — 27210794 ZZH OR GENERAL SUPPLY STERILE: Performed by: OTOLARYNGOLOGY

## 2018-04-24 PROCEDURE — 25000125 ZZHC RX 250: Performed by: NURSE ANESTHETIST, CERTIFIED REGISTERED

## 2018-04-24 PROCEDURE — 42826 REMOVAL OF TONSILS: CPT | Performed by: OTOLARYNGOLOGY

## 2018-04-24 PROCEDURE — 25000566 ZZH SEVOFLURANE, EA 15 MIN: Performed by: OTOLARYNGOLOGY

## 2018-04-24 PROCEDURE — 37000009 ZZH ANESTHESIA TECHNICAL FEE, EACH ADDTL 15 MIN: Performed by: OTOLARYNGOLOGY

## 2018-04-24 PROCEDURE — 40000306 ZZH STATISTIC PRE PROC ASSESS II: Performed by: OTOLARYNGOLOGY

## 2018-04-24 PROCEDURE — 88304 TISSUE EXAM BY PATHOLOGIST: CPT | Performed by: OTOLARYNGOLOGY

## 2018-04-24 PROCEDURE — 36000052 ZZH SURGERY LEVEL 2 EA 15 ADDTL MIN: Performed by: OTOLARYNGOLOGY

## 2018-04-24 PROCEDURE — 71000014 ZZH RECOVERY PHASE 1 LEVEL 2 FIRST HR: Performed by: OTOLARYNGOLOGY

## 2018-04-24 PROCEDURE — 25000128 H RX IP 250 OP 636: Performed by: NURSE ANESTHETIST, CERTIFIED REGISTERED

## 2018-04-24 PROCEDURE — 25000125 ZZHC RX 250: Performed by: OTOLARYNGOLOGY

## 2018-04-24 PROCEDURE — 71000027 ZZH RECOVERY PHASE 2 EACH 15 MINS: Performed by: OTOLARYNGOLOGY

## 2018-04-24 PROCEDURE — 36000050 ZZH SURGERY LEVEL 2 1ST 30 MIN: Performed by: OTOLARYNGOLOGY

## 2018-04-24 PROCEDURE — 88304 TISSUE EXAM BY PATHOLOGIST: CPT | Mod: 26 | Performed by: OTOLARYNGOLOGY

## 2018-04-24 RX ORDER — FENTANYL CITRATE 50 UG/ML
25-50 INJECTION, SOLUTION INTRAMUSCULAR; INTRAVENOUS
Status: DISCONTINUED | OUTPATIENT
Start: 2018-04-24 | End: 2018-04-24 | Stop reason: HOSPADM

## 2018-04-24 RX ORDER — MEPERIDINE HYDROCHLORIDE 25 MG/ML
12.5 INJECTION INTRAMUSCULAR; INTRAVENOUS; SUBCUTANEOUS
Status: COMPLETED | OUTPATIENT
Start: 2018-04-24 | End: 2018-04-24

## 2018-04-24 RX ORDER — ONDANSETRON 4 MG/1
4-8 TABLET, ORALLY DISINTEGRATING ORAL EVERY 8 HOURS PRN
Qty: 14 TABLET | Refills: 0 | Status: SHIPPED | OUTPATIENT
Start: 2018-04-24 | End: 2019-03-05

## 2018-04-24 RX ORDER — ONDANSETRON 2 MG/ML
INJECTION INTRAMUSCULAR; INTRAVENOUS PRN
Status: DISCONTINUED | OUTPATIENT
Start: 2018-04-24 | End: 2018-04-24

## 2018-04-24 RX ORDER — BUPIVACAINE HYDROCHLORIDE 2.5 MG/ML
INJECTION, SOLUTION INFILTRATION; PERINEURAL PRN
Status: DISCONTINUED | OUTPATIENT
Start: 2018-04-24 | End: 2018-04-24 | Stop reason: HOSPADM

## 2018-04-24 RX ORDER — ONDANSETRON 2 MG/ML
4 INJECTION INTRAMUSCULAR; INTRAVENOUS EVERY 30 MIN PRN
Status: DISCONTINUED | OUTPATIENT
Start: 2018-04-24 | End: 2018-04-24 | Stop reason: HOSPADM

## 2018-04-24 RX ORDER — SODIUM CHLORIDE, SODIUM LACTATE, POTASSIUM CHLORIDE, CALCIUM CHLORIDE 600; 310; 30; 20 MG/100ML; MG/100ML; MG/100ML; MG/100ML
INJECTION, SOLUTION INTRAVENOUS CONTINUOUS
Status: DISCONTINUED | OUTPATIENT
Start: 2018-04-24 | End: 2018-04-24 | Stop reason: HOSPADM

## 2018-04-24 RX ORDER — NALOXONE HYDROCHLORIDE 0.4 MG/ML
.1-.4 INJECTION, SOLUTION INTRAMUSCULAR; INTRAVENOUS; SUBCUTANEOUS
Status: DISCONTINUED | OUTPATIENT
Start: 2018-04-24 | End: 2018-04-24 | Stop reason: HOSPADM

## 2018-04-24 RX ORDER — LIDOCAINE HYDROCHLORIDE 20 MG/ML
INJECTION, SOLUTION INFILTRATION; PERINEURAL PRN
Status: DISCONTINUED | OUTPATIENT
Start: 2018-04-24 | End: 2018-04-24

## 2018-04-24 RX ORDER — DEXAMETHASONE SODIUM PHOSPHATE 10 MG/ML
10 INJECTION INTRAMUSCULAR; INTRAVENOUS ONCE
Status: COMPLETED | OUTPATIENT
Start: 2018-04-24 | End: 2018-04-24

## 2018-04-24 RX ORDER — PROPOFOL 10 MG/ML
INJECTION, EMULSION INTRAVENOUS PRN
Status: DISCONTINUED | OUTPATIENT
Start: 2018-04-24 | End: 2018-04-24

## 2018-04-24 RX ORDER — FENTANYL CITRATE 50 UG/ML
INJECTION, SOLUTION INTRAMUSCULAR; INTRAVENOUS PRN
Status: DISCONTINUED | OUTPATIENT
Start: 2018-04-24 | End: 2018-04-24

## 2018-04-24 RX ORDER — AMOXICILLIN 400 MG/5ML
400 POWDER, FOR SUSPENSION ORAL 2 TIMES DAILY
Qty: 50 ML | Refills: 0 | Status: SHIPPED | OUTPATIENT
Start: 2018-04-24 | End: 2018-04-29

## 2018-04-24 RX ORDER — ONDANSETRON 4 MG/1
4 TABLET, ORALLY DISINTEGRATING ORAL EVERY 30 MIN PRN
Status: DISCONTINUED | OUTPATIENT
Start: 2018-04-24 | End: 2018-04-24 | Stop reason: HOSPADM

## 2018-04-24 RX ORDER — HYDROMORPHONE HYDROCHLORIDE 1 MG/ML
.3-.5 INJECTION, SOLUTION INTRAMUSCULAR; INTRAVENOUS; SUBCUTANEOUS EVERY 10 MIN PRN
Status: DISCONTINUED | OUTPATIENT
Start: 2018-04-24 | End: 2018-04-24 | Stop reason: HOSPADM

## 2018-04-24 RX ORDER — OXYCODONE AND ACETAMINOPHEN 5; 325 MG/1; MG/1
1-2 TABLET ORAL EVERY 4 HOURS PRN
Status: DISCONTINUED | OUTPATIENT
Start: 2018-04-24 | End: 2018-04-24 | Stop reason: HOSPADM

## 2018-04-24 RX ORDER — OXYCODONE AND ACETAMINOPHEN 5; 325 MG/1; MG/1
1-2 TABLET ORAL EVERY 4 HOURS PRN
Qty: 60 TABLET | Refills: 0 | Status: SHIPPED | OUTPATIENT
Start: 2018-04-24 | End: 2019-03-05

## 2018-04-24 RX ADMIN — BUPIVACAINE HYDROCHLORIDE 10 ML: 2.5 INJECTION, SOLUTION EPIDURAL; INFILTRATION; INTRACAUDAL; PERINEURAL at 10:56

## 2018-04-24 RX ADMIN — HYDROMORPHONE HYDROCHLORIDE 0.5 MG: 1 INJECTION, SOLUTION INTRAMUSCULAR; INTRAVENOUS; SUBCUTANEOUS at 12:09

## 2018-04-24 RX ADMIN — LIDOCAINE HYDROCHLORIDE 1 ML: 10 INJECTION, SOLUTION EPIDURAL; INFILTRATION; INTRACAUDAL at 09:28

## 2018-04-24 RX ADMIN — FENTANYL CITRATE 50 MCG: 50 INJECTION, SOLUTION INTRAMUSCULAR; INTRAVENOUS at 11:54

## 2018-04-24 RX ADMIN — DEXAMETHASONE SODIUM PHOSPHATE 10 MG: 10 INJECTION INTRAMUSCULAR; INTRAVENOUS at 10:38

## 2018-04-24 RX ADMIN — SODIUM CHLORIDE, POTASSIUM CHLORIDE, SODIUM LACTATE AND CALCIUM CHLORIDE: 600; 310; 30; 20 INJECTION, SOLUTION INTRAVENOUS at 11:46

## 2018-04-24 RX ADMIN — Medication 100 MG: at 10:38

## 2018-04-24 RX ADMIN — MIDAZOLAM 2 MG: 1 INJECTION INTRAMUSCULAR; INTRAVENOUS at 10:31

## 2018-04-24 RX ADMIN — FENTANYL CITRATE 50 MCG: 50 INJECTION, SOLUTION INTRAMUSCULAR; INTRAVENOUS at 10:36

## 2018-04-24 RX ADMIN — FENTANYL CITRATE 50 MCG: 50 INJECTION, SOLUTION INTRAMUSCULAR; INTRAVENOUS at 11:46

## 2018-04-24 RX ADMIN — LIDOCAINE HYDROCHLORIDE 100 MG: 20 INJECTION, SOLUTION INFILTRATION; PERINEURAL at 10:38

## 2018-04-24 RX ADMIN — PROPOFOL 200 MG: 10 INJECTION, EMULSION INTRAVENOUS at 10:38

## 2018-04-24 RX ADMIN — OXYCODONE HYDROCHLORIDE AND ACETAMINOPHEN 2 TABLET: 5; 325 TABLET ORAL at 12:51

## 2018-04-24 RX ADMIN — FENTANYL CITRATE 50 MCG: 50 INJECTION, SOLUTION INTRAMUSCULAR; INTRAVENOUS at 10:37

## 2018-04-24 RX ADMIN — ONDANSETRON 4 MG: 2 INJECTION INTRAMUSCULAR; INTRAVENOUS at 10:38

## 2018-04-24 RX ADMIN — MEPERIDINE HYDROCHLORIDE 12.5 MG: 25 INJECTION, SOLUTION INTRAMUSCULAR; INTRAVENOUS; SUBCUTANEOUS at 12:08

## 2018-04-24 RX ADMIN — SODIUM CHLORIDE, POTASSIUM CHLORIDE, SODIUM LACTATE AND CALCIUM CHLORIDE: 600; 310; 30; 20 INJECTION, SOLUTION INTRAVENOUS at 09:28

## 2018-04-24 RX ADMIN — MEPERIDINE HYDROCHLORIDE 12.5 MG: 25 INJECTION, SOLUTION INTRAMUSCULAR; INTRAVENOUS; SUBCUTANEOUS at 11:47

## 2018-04-24 NOTE — OP NOTE
OTOLARYNGOLOGY OPERATIVE NOTE    SURGEON:  Christina Tavares MD      ASSISTANT: NONE     PREOPERATIVE DIAGNOSIS:  Chronic tonsillitis .      POSTOPERATIVE DIAGNOSIS:  Chronic tonsillitis .      PROCEDURE:  Tonsillectomy .      INDICATIONS:  As above.      SURGICAL FINDINGS:  AS ABOVE    Date: 4/24/2018    BRIEF HISTORY: Patient is an 28 year old year old with a history of chronic tonsillitis    despite medical therapy.  Family understands.  The patient understands the risks and benefits of the surgery, alternatives, limitations, complications including but not limited to bleeding, infection, nasopharyngeal stenosis, oropharyngeal stenosis, bleeding severe enough to necessitate reoperation, risks related to anesthesia amongst others.  They wish surgery and now fully agree to it.        DESCRIPTION OF PROCEDURE:  The patient was taken to the OR, placed under general endotracheal anesthetic, appropriately positioned, prepped and draped.  At this point, we appreciate tonsils being 3+.  We injected the anterior and posterior tonsillar pillars with 0.25% plain Marcaine.  The left tonsil was engaged in the superior pole, retracted medially.  Using Arthrocare Coblator tip at a setting of 6 with continuous irrigation, an incision was made in the anterior pillar.  We defined the capsule, staying above it.  We carefully dissected the tonsil while controlling hemostasis with a Coblator tip, provided bipolar cautery.  On the right side, we did the same.  The tonsil was engaged, retracted medially.  We made an incision in the anterior pillar, defined the capsule, staying above it.  Dissected the tonsil while controlling hemostasis with a Coblator tip, provided bipolar cautery.  The tonsil was removed without difficulty.  Hemostasis was well controlled. Patient was extubated in the OR, taken to recovery in stable condition with less than 10 mL blood loss.         CHRISTINA TAVARES MD

## 2018-04-24 NOTE — IP AVS SNAPSHOT
MRN:[id number]                      After Visit Summary   4/24/2018    Ezra Murguia    MRN: [id number]           Thank you!     Thank you for choosing [facility] for your care. Our goal is always to provide you with excellent care. Hearing back from our patients is one way we can continue to improve our services. Please take a few minutes to complete the written survey that you may receive in the mail after you visit with us. Thank you!        Patient Information     Date Of Birth          1990        About your hospital stay     You were admitted on:  April 24, 2018 You last received care in the:  [facility]    You were discharged on:  April 24, 2018       Who to Call     For medical emergencies, please call 911.  For non-urgent questions about your medical care, please call your primary care provider or clinic, [phone]  For questions related to your surgery, please call your surgery clinic        Attending Provider     Provider Joesph Gan MD Otolaryngology       Primary Care Provider Office Phone # Fax #    Rachel Lion [phone]      After Care Instructions     Check with Provider when to start anticoagulants           Diet Instructions       Resume pre procedure diet            Discharge Instructions       Patient to follow up with surgeon in 13 days            Discharge Instructions - Lifting restrictions       Lifting Restrictions 30 pounds until seen at Post-op follow up appointment            No Alcohol       For 24 hours following procedure            No Aspirin, Ibuprofen or Naproxen products       for 7 - 10 days following surgery            No driving or operating machinery       until the day after procedure            Notify Physician        If bleeding or dressing becomes loose or dislodged                  Your next 10 appointments already scheduled     May 10, 2018 10:30 AM CDT   Return Visit with Joesph Tavares  "MD   [facility]    [address] [place] [address]  [place] [id number] [phone]              Further instructions from your care team                HOME CARE INSTRUCTIONS FOR PATIENTS WHO HAVE HAD A TONSILLECTOMY/TONSILLECTOMY AND ADENOIDECTOMY (T&A)  [phone]    HOME CARE INSTRUCTIONS  1.   Remember to be encouraging and positive to your child.  2.   Be your child's \"cheerleader\".  Cheer he/has on when child is doing what is important (i.e. Drinking fluids)  3.   Ideas to use to encourage wellness: have your child use their favorite colored marker to draw a smiling face on a piece of paper every time they take a drink and cheer them on!  Use fun stickers to reward when they drink, especially the first couple of days when it is the hardest for them.  4.   Appropriate encouragement is authorized (i.e. \"we'll go to DQ when you are all better\").        DIET INSTRUCTIONS:  1.   The patient should be encouraged to drink liquids as much as possible the same day of surgery; however, avoid citrus juices, as they will cause throat pain.  2.   For the first day or two at home, ginger ale, broth, popsicles, Jell-O, and soft cooked eggs are recommended for eating.  Then allow the patient to progress to a soft diet at his or her own pace. Avoid foods that are hard, crumble, have small pieces, or have sharp edges. Avoid citrus juices for 2 weeks.  3.   For the first 14 days, drink plenty of fluids, water, ginger ale, and apple juice. Avoid citrus fruit juices, such as orange juice and grapefruit: hot fluids: rough vegetables such as raw vegetables, corn chips, peanuts, popcorn, and highly spiced foods.              ACTIVITES:  1. The patient should have many short rest periods during the first three days at home.  2. Return to school or work approximately 10 days after discharge from the hospital.  3. Avoid vigorous activity and exercise of any kind for the full 14 days following " surgery.  4. Do not leave town for 14 days, i.e. stay within a 30-minute driving distance of the hospital where surgery was done.             Other things to avoid:  1. People with colds.  2. Aspirin, including Aspergum; Advil, Motrin, Ibuprofen, Aleve, Naproxen and similar medications.  Use only Tylenol or your prescribed pain medication as directed.        If bleeding occurs at any time call your doctor's office  at [phone] and/or go to the Emergency department where your surgery was performed.    If patient temperature rises to 101 degrees and does not come down with Tylenol call our office.    Chloroseptic throat spray may be used in the throat to help alleviate pain.    The patient s stool may be dark or black for the first two days following surgery. Do not let this alarm you.    PAIN MEDICATION WILL NOT BE FILLED AFTER NORMAL CLINIC HOURS OR ON WEEKENDS.    If any questions please call the doctor's office at [phone].      Same-Day Surgery Anesthesia  Adult Discharge Orders & Instructions     For 24 hours after surgery    1. Get plenty of rest.  A responsible adult must stay with you for at least 24 hours after you leave the hospital.   2. Do not drive or use heavy equipment.  If you have weakness or tingling, don't drive or use heavy equipment until this feeling goes away.  3. Do not drink alcohol.  4. Avoid strenuous or risky activities.  Ask for help when climbing stairs.   5. You may feel lightheaded.  IF so, sit for a few minutes before standing.  Have someone help you get up.   6. You may have a slight fever. Call the doctor if your fever is over 100 F (37.7 C) (taken under the tongue) or lasts longer than 24 hours.  7. You may have a dry mouth, a sore throat, muscle aches or trouble sleeping.  These should go away after 24 hours.  8. Do not make important or legal decisions.  Based on the surgery/procedure that you had today, we do not anticipate that you will have any problems.  However,  given the various responses that patients can have to the surgical experience, we want to ensure that you have information available to manage pain or nausea and what to do if you observe bleeding or you develop any signs and symptoms of infection:  Methods to control pain include:  Prescription pain medication or over the counter medications as prescribed or suggested by your physician.  In addition, ice packs and periods of rest are often helpful.  If your pain is not managed with the above methods, contact your physician.  Methods to control nausea include:  Anti-nausea medication approved by your physician.  Drink clear liquids such as apple juice, ginger ale, broth or 7-Up. Be sure to drink enough fluids.  Move to a regular diet as you feel able.  Rest may also help.  Bleeding:  It's not uncommon to see a little blood staining on the dressing, about the size of a quarter in the first 24 hours; if you see this, there is no reason to be alarmed.  However, should this continue to increase in size, apply pressure if able, ant notify your physician.  Infection:  We do not anticipate that you will acquire an infection, but if you should experience any of the following symptoms:  redness, swelling, heat, increasing pain or abnormal drainage at your surgery site, fever or chills, please notify your physician.    Call your doctor for any of the followin.  Signs of infection (fever, growing tenderness at the surgery site, a large amount of drainage or bleeding, severe pain, foul-smelling drainage, redness, swelling).    2. It has been over 8 to 10 hours since surgery and you are still not able to urinate (pass water).    3.  Headache for over 24 hours.    4.  Numbness, tingling or weakness the day after surgery (if you had spinal anesthesia).    24 hour Nurse advice line: [phone]      Pending Results     Date and Time Order Name Status Description    2018 1059 Surgical pathology exam In process            "  Admission Information     Date & Time Provider Department Dept. Phone    2018 Joesph Tavares MD [facility] Phase [phone]      Your Vitals Were     Blood Pressure Temperature Respirations Height Weight Pulse Oximetry    150/94 97.9  F (36.6  C) (Oral) 16 1.88 m (6' 2\") 83.8 kg (184 lb 12.8 oz) 99%    BMI (Body Mass Index)                   23.73 kg/m2           [employer] Information     [employer] lets you send messages to your doctor, view your test results, renew your prescriptions, schedule appointments and more. To sign up, go to [email or web address] . Click on \"Log in\" on the left side of the screen, which will take you to the Welcome page. Then click on \"Sign up Now\" on the right side of the page.     You will be asked to enter the access code listed below, as well as some personal information. Please follow the directions to create your username and password.     Your access code is: [email or web address]  Expires: 2018  4:41 PM     Your access code will  in 90 days. If you need help or a new code, please call your [facility] clinic or [phone].        Care EveryWhere ID     This is your Care EveryWhere ID. This could be used by other organizations to access your [facility] medical records  [id number]        Equal Access to Services     [facility] CenterDESTINY : Hadii og ojedao Soalma, waaxda luqadaha, qaybta kaalmada horaceda, beverly valencia . So [facility] [phone].    ATENCIÓN: Si habla español, tiene a valdez disposición servicios gratuitos de asistencia lingüística. Kari al [phone].    We comply with applicable federal civil rights laws and [place] laws. We do not discriminate on the basis of race, color, national origin, age, disability, sex, sexual orientation, or gender identity.               Review of your medicines      START taking        Dose / Directions    amoxicillin 400 MG/5ML suspension   Commonly known as:  AMOXIL   Used for:  Chronic " tonsillitis        Dose:  400 mg   Take 5 mLs (400 mg) by mouth 2 times daily for 5 days   Quantity:  50 mL   Refills:  0       magic mouthwash suspension   Commonly known as:  ENTER INGREDIENTS IN COMMENTS   Used for:  Post-op pain        Dose:  10 mL   Take 10 mLs by mouth every 4 hours as needed   Quantity:  480 mL   Refills:  1       ondansetron 4 MG ODT tab   Commonly known as:  ZOFRAN-ODT   Used for:  Post-operative nausea and vomiting        Dose:  4-8 mg   Take 1-2 tablets (4-8 mg) by mouth every 8 hours as needed for nausea Dissolve ON the tongue.   Quantity:  14 tablet   Refills:  0       oxyCODONE-acetaminophen 5-325 MG per tablet   Commonly known as:  PERCOCET   Used for:  Post-op pain        Dose:  1-2 tablet   Take 1-2 tablets by mouth every 4 hours as needed for pain (moderate to severe)   Quantity:  60 tablet   Refills:  0         CONTINUE these medicines which have NOT CHANGED        Dose / Directions    BENADRYL PO        Dose:  50 mg   Take 50 mg by mouth   Refills:  0            Where to get your medicines      Some of these will need a paper prescription and others can be bought over the counter. Ask your nurse if you have questions.     Bring a paper prescription for each of these medications     amoxicillin 400 MG/5ML suspension    magic mouthwash suspension    ondansetron 4 MG ODT tab    oxyCODONE-acetaminophen 5-325 MG per tablet                Protect others around you: Learn how to safely use, store and throw away your medicines at www.disposemymeds.org.        ANTIBIOTIC INSTRUCTION     You've Been Prescribed an Antibiotic - Now What?  Your healthcare team thinks that you or your loved one might have an infection. Some infections can be treated with antibiotics, which are powerful, life-saving drugs. Like all medications, antibiotics have side effects and should only be used when necessary. There are some important things you should know about your antibiotic treatment.      Your  healthcare team may run tests before you start taking an antibiotic.    Your team may take samples (e.g., from your blood, urine or other areas) to run tests to look for bacteria. These test can be important to determine if you need an antibiotic at all and, if you do, which antibiotic will work best.      Within a few days, your healthcare team might change or even stop your antibiotic.    Your team may start you on an antibiotic while they are working to find out what is making you sick.    Your team might change your antibiotic because test results show that a different antibiotic would be better to treat your infection.    In some cases, once your team has more information, they learn that you do not need an antibiotic at all. They may find out that you don't have an infection, or that the antibiotic you're taking won't work against your infection. For example, an infection caused by a virus can't be treated with antibiotics. Staying on an antibiotic when you don't need it is more likely to be harmful than helpful.      You may experience side effects from your antibiotic.    Like all medications, antibiotics have side effects. Some of these can be serious.    Let you healthcare team know if you have any known allergies when you are admitted to the hospital.    One significant side effect of nearly all antibiotics is the risk of severe and sometimes deadly diarrhea caused by Clostridium difficile (C. Difficile). This occurs when a person takes antibiotics because some good germs are destroyed. Antibiotic use allows C. diificile to take over, putting patients at high risk for this serious infection.    As a patient or caregiver, it is important to understand your or your loved one's antibiotic treatment. It is especially important for caregivers to speak up when patients can't speak for themselves. Here are some important questions to ask your healthcare team.    What infection is this antibiotic treating and how  do you know I have that infection?    What side effects might occur from this antibiotic?    How long will I need to take this antibiotic?    Is it safe to take this antibiotic with other medications or supplements (e.g., vitamins) that I am taking?     Are there any special directions I need to know about taking this antibiotic? For example, should I take it with food?    How will I be monitored to know whether my infection is responding to the antibiotic?    What tests may help to make sure the right antibiotic is prescribed for me?      Information provided by:  www.cdc.gov/getsmart  U.S. Department of Health and Human Services  Centers for disease Control and Prevention  National Center for Emerging and Zoonotic Infectious Diseases  Division of Healthcare Quality Promotion        Information about OPIOIDS     PRESCRIPTION OPIOIDS: WHAT YOU NEED TO KNOW   You have a prescription for an opioid (narcotic) pain medicine. Opioids can cause addiction. If you have a history of chemical dependency of any type, you are at a higher risk of becoming addicted to opioids. Only take this medicine after all other options have been tried. Take it for as short a time and as few doses as possible.     Do not:    Drive. If you drive while taking these medicines, you could be arrested for driving under the influence (DUI).    Operate heavy machinery    Do any other dangerous activities while taking these medicines.     Drink any alcohol while taking these medicines.      Take with any other medicines that contain acetaminophen. Read all labels carefully. Look for the word  acetaminophen  or  Tylenol.  Ask your pharmacist if you have questions or are unsure.    Store your pills in a secure place, locked if possible. We will not replace any lost or stolen medicine. If you don t finish your medicine, please throw away (dispose) as directed by your pharmacist. The [place] Pollution Control Agency has more information about safe  disposal: https://www.pca.[facility].us/living-green/managing-unwanted-medications    All opioids tend to cause constipation. Drink plenty of water and eat foods that have a lot of fiber, such as fruits, vegetables, prune juice, apple juice and high-fiber cereal. Take a laxative (Miralax, milk of magnesia, Colace, Senna) if you don t move your bowels at least every other day.              Medication List: This is a list of all your medications and when to take them. Check marks below indicate your daily home schedule. Keep this list as a reference.      Medications           Morning Afternoon Evening Bedtime As Needed    amoxicillin 400 MG/5ML suspension   Commonly known as:  AMOXIL   Take 5 mLs (400 mg) by mouth 2 times daily for 5 days                                BENADRYL PO   Take 50 mg by mouth                                magic mouthwash suspension   Commonly known as:  ENTER INGREDIENTS IN COMMENTS   Take 10 mLs by mouth every 4 hours as needed                                ondansetron 4 MG ODT tab   Commonly known as:  ZOFRAN-ODT   Take 1-2 tablets (4-8 mg) by mouth every 8 hours as needed for nausea Dissolve ON the tongue.                                oxyCODONE-acetaminophen 5-325 MG per tablet   Commonly known as:  PERCOCET   Take 1-2 tablets by mouth every 4 hours as needed for pain (moderate to severe)   Last time this was given:  2 tablets on 4/24/2018 12:51 PM   Next Dose Due:  4:51pm

## 2018-04-24 NOTE — DISCHARGE INSTRUCTIONS
"         HOME CARE INSTRUCTIONS FOR PATIENTS WHO HAVE HAD A TONSILLECTOMY/TONSILLECTOMY AND ADENOIDECTOMY (T&A)  [phone]    HOME CARE INSTRUCTIONS  1.   Remember to be encouraging and positive to your child.  2.   Be your child's \"cheerleader\".  Cheer he/has on when child is doing what is important (i.e. Drinking fluids)  3.   Ideas to use to encourage wellness: have your child use their favorite colored marker to draw a smiling face on a piece of paper every time they take a drink and cheer them on!  Use fun stickers to reward when they drink, especially the first couple of days when it is the hardest for them.  4.   Appropriate encouragement is authorized (i.e. \"we'll go to DQ when you are all better\").        DIET INSTRUCTIONS:  1.   The patient should be encouraged to drink liquids as much as possible the same day of surgery; however, avoid citrus juices, as they will cause throat pain.  2.   For the first day or two at home, ginger ale, broth, popsicles, Jell-O, and soft cooked eggs are recommended for eating.  Then allow the patient to progress to a soft diet at his or her own pace. Avoid foods that are hard, crumble, have small pieces, or have sharp edges. Avoid citrus juices for 2 weeks.  3.   For the first 14 days, drink plenty of fluids, water, ginger ale, and apple juice. Avoid citrus fruit juices, such as orange juice and grapefruit: hot fluids: rough vegetables such as raw vegetables, corn chips, peanuts, popcorn, and highly spiced foods.              ACTIVITES:  1. The patient should have many short rest periods during the first three days at home.  2. Return to school or work approximately 10 days after discharge from the hospital.  3. Avoid vigorous activity and exercise of any kind for the full 14 days following surgery.  4. Do not leave town for 14 days, i.e. stay within a 30-minute driving distance of the hospital where surgery was done.             Other things to avoid:  1. People with " colds.  2. Aspirin, including Aspergum; Advil, Motrin, Ibuprofen, Aleve, Naproxen and similar medications.  Use only Tylenol or your prescribed pain medication as directed.        If bleeding occurs at any time call your doctor's office  at [phone] and/or go to the Emergency department where your surgery was performed.    If patient temperature rises to 101 degrees and does not come down with Tylenol call our office.    Chloroseptic throat spray may be used in the throat to help alleviate pain.    The patient s stool may be dark or black for the first two days following surgery. Do not let this alarm you.    PAIN MEDICATION WILL NOT BE FILLED AFTER NORMAL CLINIC HOURS OR ON WEEKENDS.    If any questions please call the doctor's office at [phone].      Same-Day Surgery Anesthesia  Adult Discharge Orders & Instructions     For 24 hours after surgery    1. Get plenty of rest.  A responsible adult must stay with you for at least 24 hours after you leave the hospital.   2. Do not drive or use heavy equipment.  If you have weakness or tingling, don't drive or use heavy equipment until this feeling goes away.  3. Do not drink alcohol.  4. Avoid strenuous or risky activities.  Ask for help when climbing stairs.   5. You may feel lightheaded.  IF so, sit for a few minutes before standing.  Have someone help you get up.   6. You may have a slight fever. Call the doctor if your fever is over 100 F (37.7 C) (taken under the tongue) or lasts longer than 24 hours.  7. You may have a dry mouth, a sore throat, muscle aches or trouble sleeping.  These should go away after 24 hours.  8. Do not make important or legal decisions.  Based on the surgery/procedure that you had today, we do not anticipate that you will have any problems.  However, given the various responses that patients can have to the surgical experience, we want to ensure that you have information available to manage pain or nausea and what to do if you  observe bleeding or you develop any signs and symptoms of infection:  Methods to control pain include:  Prescription pain medication or over the counter medications as prescribed or suggested by your physician.  In addition, ice packs and periods of rest are often helpful.  If your pain is not managed with the above methods, contact your physician.  Methods to control nausea include:  Anti-nausea medication approved by your physician.  Drink clear liquids such as apple juice, ginger ale, broth or 7-Up. Be sure to drink enough fluids.  Move to a regular diet as you feel able.  Rest may also help.  Bleeding:  It's not uncommon to see a little blood staining on the dressing, about the size of a quarter in the first 24 hours; if you see this, there is no reason to be alarmed.  However, should this continue to increase in size, apply pressure if able, ant notify your physician.  Infection:  We do not anticipate that you will acquire an infection, but if you should experience any of the following symptoms:  redness, swelling, heat, increasing pain or abnormal drainage at your surgery site, fever or chills, please notify your physician.    Call your doctor for any of the followin.  Signs of infection (fever, growing tenderness at the surgery site, a large amount of drainage or bleeding, severe pain, foul-smelling drainage, redness, swelling).    2. It has been over 8 to 10 hours since surgery and you are still not able to urinate (pass water).    3.  Headache for over 24 hours.    4.  Numbness, tingling or weakness the day after surgery (if you had spinal anesthesia).    24 hour Nurse advice line: [phone]

## 2018-04-24 NOTE — ANESTHESIA POSTPROCEDURE EVALUATION
Patient: Ezra Murguia    Procedure(s):  tonsillectomy - Wound Class: II-Clean Contaminated    Diagnosis:chronic tonsillitis  Diagnosis Additional Information: No value filed.    Anesthesia Type:  General, ETT    Note:  Anesthesia Post Evaluation    Patient location during evaluation: Bedside and PACU  Patient participation: Able to fully participate in evaluation  Level of consciousness: awake and alert  Pain management: satisfactory to patient  Airway patency: patent  Cardiovascular status: stable  Respiratory status: room air and spontaneous ventilation  Hydration status: stable  PONV: none     Anesthetic complications: None    Comments: Appear to tolerate Gen well without anesthesia related problems / complications noted.  Pain level adequate per patient. No N  /  V last night.  No complaints per patient.  Will follow as needed.        Last vitals:  Vitals:    04/24/18 1145 04/24/18 1151 04/24/18 1200   BP: (!) 172/115 (!) 152/106 (!) 152/104   Resp: 16 12 16   Temp:      SpO2: 100% 100% 99%         Electronically Signed By: ROSANNE Proctor CRNA  April 24, 2018  12:22 PM

## 2018-04-24 NOTE — IP AVS SNAPSHOT
[facility] Phase II    [address]     [place] [address]    Phone:  [phone]                                       After Visit Summary   4/24/2018    Ezra Murguia    MRN: [id number]           After Visit Summary Signature Page     I have received my discharge instructions, and my questions have been answered. I have discussed any challenges I see with this plan with the nurse or doctor.    ..........................................................................................................................................  Patient/Patient Representative Signature      ..........................................................................................................................................  Patient Representative Print Name and Relationship to Patient    ..................................................               ................................................  Date                                            Time    ..........................................................................................................................................  Reviewed by Signature/Title    ...................................................              ..............................................  Date                                                            Time

## 2018-04-24 NOTE — ANESTHESIA CARE TRANSFER NOTE
Patient: Ezra Murguia    Procedure(s):  tonsillectomy - Wound Class: II-Clean Contaminated    Diagnosis: chronic tonsillitis  Diagnosis Additional Information: No value filed.    Anesthesia Type:   General, ETT     Note:  Airway :Face Mask  Patient transferred to:PACU  Handoff Report: Identifed the Patient, Identified the Reponsible Provider, Reviewed the pertinent medical history, Discussed the surgical course, Reviewed Intra-OP anesthesia mangement and issues during anesthesia, Set expectations for post-procedure period and Allowed opportunity for questions and acknowledgement of understanding      Vitals: (Last set prior to Anesthesia Care Transfer)    CRNA VITALS  4/24/2018 1056 - 4/24/2018 1156      4/24/2018             Pulse: 69    SpO2: (!)  81 %                Electronically Signed By: ROSANNE Proctor CRNA  April 24, 2018  12:15 PM

## 2018-04-25 ENCOUNTER — NURSE TRIAGE (OUTPATIENT)
Dept: NURSING | Facility: CLINIC | Age: 28
End: 2018-04-25

## 2018-04-25 ENCOUNTER — HOSPITAL ENCOUNTER (EMERGENCY)
Facility: CLINIC | Age: 28
Discharge: HOME | End: 2018-04-25
Attending: FAMILY MEDICINE | Admitting: FAMILY MEDICINE
Payer: COMMERCIAL

## 2018-04-25 VITALS
BODY MASS INDEX: 22.07 KG/M2 | DIASTOLIC BLOOD PRESSURE: 84 MMHG | SYSTOLIC BLOOD PRESSURE: 136 MMHG | WEIGHT: 172 LBS | OXYGEN SATURATION: 99 % | HEART RATE: 72 BPM | TEMPERATURE: 98.2 F | RESPIRATION RATE: 12 BRPM | HEIGHT: 74 IN

## 2018-04-25 DIAGNOSIS — J95.830 POST-TONSILLECTOMY HEMORRHAGE: ICD-10-CM

## 2018-04-25 PROCEDURE — 99282 EMERGENCY DEPT VISIT SF MDM: CPT | Performed by: FAMILY MEDICINE

## 2018-04-25 PROCEDURE — 99282 EMERGENCY DEPT VISIT SF MDM: CPT | Mod: Z6 | Performed by: FAMILY MEDICINE

## 2018-04-25 NOTE — DISCHARGE INSTRUCTIONS
1.  I spoke to your ENT physician and he wants you to suck on popsicles or really cold things throughout the day today.  He wants you to avoid any red popsicles though to avoid getting this confused for bleeding.  2.  Try to avoid coughing or sneezing or bearing down.  3.  If the bleeding returns and is significant, unfortunately you will have to return to the emergency department.  ---------------------------------------

## 2018-04-25 NOTE — TELEPHONE ENCOUNTER
Reason for Disposition    [1] Severe difficulty swallowing (e.g., drooling or spitting, can't swallow water) AND [2] new onset AND [3] normal breathing    Additional Information    Negative: [1] Severe difficulty swallowing (e.g., drooling or spitting) AND [2] started suddenly after taking a medicine or allergic food    Negative: Wheezing, stridor, hoarseness, or difficulty breathing    Negative: [1] Swollen tongue AND [2] sudden onset    Negative: Sounds like a life-threatening emergency to the triager    Negative: [1] Symptoms of blocked esophagus (e.g., can't swallow normal secretions, drooling) AND [2] present now    Negative: Symptoms of food or bone stuck in throat or esophagus (e.g., pain in throat or chest, FB sensation, blood-tinged saliva)    Protocols used: SWALLOWING DIFFICULTY-ADULT-

## 2018-04-25 NOTE — ED PROVIDER NOTES
History     Chief Complaint   Patient presents with     Post-op Problem     HPI  Ezra Murguia is a 28 year old male who presents with concerns of blood noted in his mouth after having a tonsillectomy done yesterday.  Patient had a routine tonsillectomy done here yesterday by Dr. Hummel.  Patient states he went to bed and was doing fine.  He woke up the middle the night to take 1 of his pain pills and was doing fine then.  When he woke up around 6 he noticed his mouth was full of blood and he spit up some blood.  He called ENT and they told to come to the emergency department.  He has not noticed any more blood in his mouth since then.  He is otherwise feeling well.  He denies feeling lightheaded or dizzy.    Problem List:    Patient Active Problem List    Diagnosis Date Noted     Chronic tonsillitis 04/17/2018     Priority: Medium     Elevated blood pressure reading without diagnosis of hypertension 08/11/2017     Priority: Medium     Cervicalgia 08/11/2017     Priority: Medium     Tobacco use 08/11/2017     Priority: Medium        Past Medical History:    Past Medical History:   Diagnosis Date     Back pain, chronic      Chronic tonsillitis      Tobacco use        Past Surgical History:    Past Surgical History:   Procedure Laterality Date     BACK SURGERY  02/2016     HC TOOTH EXTRACTION W/FORCEP       TONSILLECTOMY N/A 4/24/2018    Procedure: TONSILLECTOMY;  tonsillectomy;  Surgeon: Joesph Tavares MD;  Location: [facility] OR       Family History:    Family History   Problem Relation Age of Onset     Hemophilia Brother      younger brother     Colon Cancer No family hx of      Prostate Cancer No family hx of      Anesthesia Reaction No family hx of        Social History:  Marital Status:   [2]  Social History   Substance Use Topics     Smoking status: Former Smoker     Types: Cigarettes     Smokeless tobacco: Never Used     Alcohol use Yes      Comment: occ        Medications:      magic mouthwash (ENTER  "INGREDIENTS IN COMMENTS) suspension   amoxicillin (AMOXIL) 400 MG/5ML suspension   DiphenhydrAMINE HCl (BENADRYL PO)   ondansetron (ZOFRAN-ODT) 4 MG ODT tab   oxyCODONE-acetaminophen (PERCOCET) 5-325 MG per tablet         Review of Systems   All other systems reviewed and are negative.      Physical Exam   BP: 136/84  Pulse: 66  Temp: 98.2  F (36.8  C)  Resp: 12  Height: 188 cm (6' 2\")  Weight: 78 kg (172 lb)  SpO2: 99 %      Physical Exam   Constitutional: He appears well-developed and well-nourished. No distress.   HENT:   Head: Normocephalic and atraumatic.   Mouth/Throat: Oropharynx is clear and moist. No oropharyngeal exudate.   There is granulation tissue noted on the tonsillar pillars and there is one area of what appears to be a stable clot on the left inferior portion of the tonsillar pillar area.  There is no active bleeding noted.  Patient drank some cold water and still did not notice any bleeding after this.   Eyes: Conjunctivae are normal.   Neck: Normal range of motion.   Cardiovascular: Normal rate, regular rhythm and normal heart sounds.    No murmur heard.  Pulmonary/Chest: Effort normal and breath sounds normal. No respiratory distress. He has no wheezes. He has no rales.   Abdominal: Soft. Bowel sounds are normal. He exhibits no distension. There is no tenderness. There is no rebound.   Skin: He is not diaphoretic.   Nursing note and vitals reviewed.      ED Course     ED Course     Procedures           Patient was monitored here for about 30 minutes and had no further bleeding.  I did speak to the patient's ear nose and throat doctor, Dr. Tavares, he is okay with discharging the patient home.  He recommended sucking on popsicles and cold things throughout the day to stabilize the clot and prevent any further bleeding.  Patient will follow up routinely if there is no further complications.    Assessments & Plan (with Medical Decision Making)  Post tonsillectomy hemorrhage     I have reviewed " the nursing notes.    I have reviewed the findings, diagnosis, plan and need for follow up with the patient.        4/25/2018   [facility] EMERGENCY DEPARTMENT     Ronny Quiles MD  04/25/18 0930

## 2018-04-25 NOTE — ED NOTES
Pt is being observed for 30 minutes, drinking water, and seeing that he doesn't have any more bleeding.

## 2018-04-25 NOTE — ED AVS SNAPSHOT
[facility] Emergency Department    [address] [place] [address]    Phone:  [phone]    Fax:  [phone]                                       Ezra Murguia   MRN: [id number]    Department:  [facility] Emergency Department   Date of Visit:  4/25/2018           Patient Information     Date Of Birth          1990        Your diagnoses for this visit were:     Post-tonsillectomy hemorrhage        You were seen by Ronny Quiles MD.      Follow-up Information     Schedule an appointment as soon as possible for a visit with Joesph Tavares MD.    Specialty:  Otolaryngology    Why:  As scheduled, sooner if any other concerns.    Contact information:    [address] [place] [address] [phone]          Discharge Instructions       1.  I spoke to your ENT physician and he wants you to suck on popsicles or really cold things throughout the day today.  He wants you to avoid any red popsicles though to avoid getting this confused for bleeding.  2.  Try to avoid coughing or sneezing or bearing down.  3.  If the bleeding returns and is significant, unfortunately you will have to return to the emergency department.  ---------------------------------------          Discharge References/Attachments     TONSILLECTOMY, POST OP BLEEDING (ENGLISH)      Your next 10 appointments already scheduled     May 10, 2018 10:30 AM CDT   Return Visit with Joesph Tavares MD   [facility]    [address] [place] [address] [phone]              24 Hour Appointment Hotline       To make an appointment at any [facility], call [phone] ([phone]). If you don't have a family doctor or clinic, we will help you find one. [facility] are conveniently located to serve the needs of you and your family.             Review of your medicines      Our records show that you are taking the medicines listed below. If  these are incorrect, please call your family doctor or clinic.        Dose / Directions Last dose taken    amoxicillin 400 MG/5ML suspension   Commonly known as:  AMOXIL   Dose:  400 mg   Quantity:  50 mL        Take 5 mLs (400 mg) by mouth 2 times daily for 5 days   Refills:  0        BENADRYL PO   Dose:  50 mg        Take 50 mg by mouth   Refills:  0        magic mouthwash suspension   Commonly known as:  ENTER INGREDIENTS IN COMMENTS   Dose:  10 mL   Quantity:  480 mL        Take 10 mLs by mouth every 4 hours as needed   Refills:  1        ondansetron 4 MG ODT tab   Commonly known as:  ZOFRAN-ODT   Dose:  4-8 mg   Quantity:  14 tablet        Take 1-2 tablets (4-8 mg) by mouth every 8 hours as needed for nausea Dissolve ON the tongue.   Refills:  0        oxyCODONE-acetaminophen 5-325 MG per tablet   Commonly known as:  PERCOCET   Dose:  1-2 tablet   Quantity:  60 tablet        Take 1-2 tablets by mouth every 4 hours as needed for pain (moderate to severe)   Refills:  0                Orders Needing Specimen Collection     None      Pending Results     Date and Time Order Name Status Description    4/24/2018 1059 Surgical pathology exam In process             Pending Culture Results     Date and Time Order Name Status Description    4/24/2018 1059 Surgical pathology exam In process             Pending Results Instructions     If you had any lab results that were not finalized at the time of your Discharge, you can call the ED Lab Result RN at [phone]. You will be contacted by this team for any positive Lab results or changes in treatment. The nurses are available 7 days a week from 10A to 6:30P.  You can leave a message 24 hours per day and they will return your call.        Thank you for choosing [facility]       Thank you for choosing [facility] for your care. Our goal is always to provide you with excellent care. Hearing back from our patients is one way we can continue to improve our services. Please take a  "few minutes to complete the written survey that you may receive in the mail after you visit with us. Thank you!        [email or web address] Information     [employer] lets you send messages to your doctor, view your test results, renew your prescriptions, schedule appointments and more. To sign up, go to [email or web address] . Click on \"Log in\" on the left side of the screen, which will take you to the Welcome page. Then click on \"Sign up Now\" on the right side of the page.     You will be asked to enter the access code listed below, as well as some personal information. Please follow the directions to create your username and password.     Your access code is: [email or web address]  Expires: 2018  4:41 PM     Your access code will  in 90 days. If you need help or a new code, please call your [facility] clinic or [phone].        Care EveryWhere ID     This is your Care EveryWhere ID. This could be used by other organizations to access your [facility] medical records  [id number]        Equal Access to Services     [facility]: Hadii og Cook, waaxda lula, qaybta kaalmagina uribe, beverly valencia . So [facility] [phone].    ATENCIÓN: Si habla español, tiene a valdez disposición servicios gratuitos de asistencia lingüística. Llame al [phone].    We comply with applicable federal civil rights laws and [place] laws. We do not discriminate on the basis of race, color, national origin, age, disability, sex, sexual orientation, or gender identity.            After Visit Summary       This is your record. Keep this with you and show to your community pharmacist(s) and doctor(s) at your next visit.                  "

## 2018-04-25 NOTE — ED AVS SNAPSHOT
[facility] Emergency Department    [address] [place] [address]    Phone:  [phone]    Fax:  [phone]                                       Ezra Murguia   MRN: [id number]    Department:  [facility] Emergency Department   Date of Visit:  4/25/2018           After Visit Summary Signature Page     I have received my discharge instructions, and my questions have been answered. I have discussed any challenges I see with this plan with the nurse or doctor.    ..........................................................................................................................................  Patient/Patient Representative Signature      ..........................................................................................................................................  Patient Representative Print Name and Relationship to Patient    ..................................................               ................................................  Date                                            Time    ..........................................................................................................................................  Reviewed by Signature/Title    ...................................................              ..............................................  Date                                                            Time

## 2018-04-27 LAB — COPATH REPORT: NORMAL

## 2018-04-28 ENCOUNTER — NURSE TRIAGE (OUTPATIENT)
Dept: NURSING | Facility: CLINIC | Age: 28
End: 2018-04-28

## 2018-04-28 ENCOUNTER — HOSPITAL ENCOUNTER (EMERGENCY)
Facility: CLINIC | Age: 28
Discharge: HOME | End: 2018-04-28
Attending: FAMILY MEDICINE | Admitting: FAMILY MEDICINE
Payer: COMMERCIAL

## 2018-04-28 VITALS
SYSTOLIC BLOOD PRESSURE: 132 MMHG | TEMPERATURE: 97.8 F | RESPIRATION RATE: 18 BRPM | WEIGHT: 170 LBS | DIASTOLIC BLOOD PRESSURE: 95 MMHG | HEIGHT: 74 IN | BODY MASS INDEX: 21.82 KG/M2 | OXYGEN SATURATION: 98 %

## 2018-04-28 DIAGNOSIS — J95.830 SECONDARY POST TONSILLECTOMY HEMORRHAGE: ICD-10-CM

## 2018-04-28 PROCEDURE — 25000132 ZZH RX MED GY IP 250 OP 250 PS 637: Performed by: FAMILY MEDICINE

## 2018-04-28 PROCEDURE — 99284 EMERGENCY DEPT VISIT MOD MDM: CPT | Mod: Z6 | Performed by: FAMILY MEDICINE

## 2018-04-28 PROCEDURE — 99283 EMERGENCY DEPT VISIT LOW MDM: CPT | Performed by: FAMILY MEDICINE

## 2018-04-28 RX ORDER — ATENOLOL 25 MG/1
12.5 TABLET ORAL ONCE
Status: COMPLETED | OUTPATIENT
Start: 2018-04-28 | End: 2018-04-28

## 2018-04-28 RX ADMIN — ATENOLOL 12.5 MG: 25 TABLET ORAL at 03:42

## 2018-04-28 ASSESSMENT — ENCOUNTER SYMPTOMS
ACTIVITY CHANGE: 0
SORE THROAT: 1
LIGHT-HEADEDNESS: 0
DIZZINESS: 0
NAUSEA: 1
SHORTNESS OF BREATH: 0
FEVER: 0
WEAKNESS: 0

## 2018-04-28 NOTE — TELEPHONE ENCOUNTER
Reason for Disposition    [1] Spitting out or coughing up fresh blood (not blood-tinged saliva) BUT [2] small amount and stopped    Additional Information    Negative: [1] Bleeding from mouth or nose AND [2] fainted or too weak to stand    Negative: [1] Spitting out, coughing up or vomiting blood (not just blood-tinged material) AND [2] large amount (OR repeated small amounts)    Negative: [1] Difficulty breathing AND [2] SEVERE (struggling for each breath, unable to speak or cry, stridor, severe retractions)    Negative: [1] Drooling or spitting out saliva (because can't swallow) AND [2] any difficulty breathing    Negative: Sounds like a life-threatening emergency to the triager    Negative: Tonsil injury not from surgery    Protocols used: TONSIL-ADENOID SURGERY-PEDIATRIC-  Caller states he had his tonsils removed and is now having fresh blood spitting out, and has a large blood clot hanging from the left side of his tonsil. Triage guideline recommend to go to ED. Caller verbalized and understands directives.

## 2018-04-28 NOTE — ED AVS SNAPSHOT
[facility] Emergency Department    [address] [place] [address]    Phone:  [phone]    Fax:  [phone]                                       Ezra Murguia   MRN: [id number]    Department:  [facility] Emergency Department   Date of Visit:  4/28/2018           After Visit Summary Signature Page     I have received my discharge instructions, and my questions have been answered. I have discussed any challenges I see with this plan with the nurse or doctor.    ..........................................................................................................................................  Patient/Patient Representative Signature      ..........................................................................................................................................  Patient Representative Print Name and Relationship to Patient    ..................................................               ................................................  Date                                            Time    ..........................................................................................................................................  Reviewed by Signature/Title    ...................................................              ..............................................  Date                                                            Time

## 2018-04-28 NOTE — ED AVS SNAPSHOT
[facility] Emergency Department    [address] [place] [address]    Phone:  [phone]    Fax:  [phone]                                       Ezra Murguia   MRN: [id number]    Department:  [facility] Emergency Department   Date of Visit:  4/28/2018           Patient Information     Date Of Birth          1990        Your diagnoses for this visit were:     Secondary post tonsillectomy hemorrhage mild right sided       You were seen by Steve Will DO.      Follow-up Information     Call Joesph Tavares MD.    Specialty:  Otolaryngology    Why:  Call Dr. Tavares on Monday to update him on your bleeding.    Contact information:    [address] [place] [address] [phone]          Follow up with [facility] Emergency Department.    Specialty:  EMERGENCY MEDICINE    Why:  If symptoms worsen    Contact information:    [address] [place] [address] [phone]    Additional information:    From [address]: Exit at [address] on south side of [place]. Turn right on [address]. Turn left at stoplight on [address]. [facility] will be in view two blocks ahead      Discharge References/Attachments     TONSILLECTOMY, POST OP BLEEDING (ENGLISH)      Your next 10 appointments already scheduled     May 10, 2018 10:30 AM CDT   Return Visit with Joesph Tavares MD   [facility] ([facility])    [address] [place] [id number] [phone]              24 Hour Appointment Hotline       To make an appointment at any [facility], call [phone]). If you don't have a family doctor or clinic, we will help you find one. [facility] are conveniently located to serve the needs of you and your family.             Review of your medicines      Our records show that you are taking the medicines listed below. If these are incorrect, please call your  family doctor or clinic.        Dose / Directions Last dose taken    amoxicillin 400 MG/5ML suspension   Commonly known as:  AMOXIL   Dose:  400 mg   Quantity:  50 mL        Take 5 mLs (400 mg) by mouth 2 times daily for 5 days   Refills:  0        BENADRYL PO   Dose:  50 mg        Take 50 mg by mouth   Refills:  0        magic mouthwash suspension   Commonly known as:  ENTER INGREDIENTS IN COMMENTS   Dose:  10 mL   Quantity:  480 mL        Take 10 mLs by mouth every 4 hours as needed   Refills:  1        ondansetron 4 MG ODT tab   Commonly known as:  ZOFRAN-ODT   Dose:  4-8 mg   Quantity:  14 tablet        Take 1-2 tablets (4-8 mg) by mouth every 8 hours as needed for nausea Dissolve ON the tongue.   Refills:  0        oxyCODONE-acetaminophen 5-325 MG per tablet   Commonly known as:  PERCOCET   Dose:  1-2 tablet   Quantity:  60 tablet        Take 1-2 tablets by mouth every 4 hours as needed for pain (moderate to severe)   Refills:  0                Orders Needing Specimen Collection     None      Pending Results     No orders found from 4/26/2018 to 4/29/2018.            Pending Culture Results     No orders found from 4/26/2018 to 4/29/2018.            Pending Results Instructions     If you had any lab results that were not finalized at the time of your Discharge, you can call the ED Lab Result RN at [phone]. You will be contacted by this team for any positive Lab results or changes in treatment. The nurses are available 7 days a week from 10A to 6:30P.  You can leave a message 24 hours per day and they will return your call.        Thank you for choosing [facility]       Thank you for choosing [facility] for your care. Our goal is always to provide you with excellent care. Hearing back from our patients is one way we can continue to improve our services. Please take a few minutes to complete the written survey that you may receive in the mail after you visit with us. Thank you!        MyChart Information      "[employer] lets you send messages to your doctor, view your test results, renew your prescriptions, schedule appointments and more. To sign up, go to [email or web address] . Click on \"Log in\" on the left side of the screen, which will take you to the Welcome page. Then click on \"Sign up Now\" on the right side of the page.     You will be asked to enter the access code listed below, as well as some personal information. Please follow the directions to create your username and password.     Your access code is: [email or web address]  Expires: 2018  4:41 PM     Your access code will  in 90 days. If you need help or a new code, please call your [facility] clinic or [phone].        Care EveryWhere ID     This is your Care EveryWhere ID. This could be used by other organizations to access your [facility] medical records  [id number]        Equal Access to Services     [facility]: Hadii og ojedao Soalma, waaxda lula, qaybta kaalmada adestu, beverly valencia . So [facility] [phone].    ATENCIÓN: Si habla español, tiene a valdez disposición servicios gratuitos de asistencia lingüística. Llame al [phone].    We comply with applicable federal civil rights laws and [place] laws. We do not discriminate on the basis of race, color, national origin, age, disability, sex, sexual orientation, or gender identity.            After Visit Summary       This is your record. Keep this with you and show to your community pharmacist(s) and doctor(s) at your next visit.                  "

## 2018-04-28 NOTE — ED NOTES
Pt woke up at 0130 to take his medication when he noticed some bleeding and a clot.  Pt states that the clot is still in his throat.  Pt states that he drank cold water to help.  Pt took a Zofran prior to arrival to help with the nausea related to the blood in his throat.  Pt had soup and a piece of toast last night to eat otherwise had been having cool liquids.

## 2018-04-29 NOTE — ED PROVIDER NOTES
History     Chief Complaint   Patient presents with     Post-op Problem     HPI  Ezra Murguia is a 28 year old male who presents to the ER with concerns about post tonsilectomy bleeding. He states that he had his tonsils removed 4 days ago by Dr. Tavares.  Patient states that he was seen here the day after surgery in this emergency room secondary to tonsillar bleeding from the left tonsillar area.  He states woke up about 2 hours ago with bleeding from the right tonsillar area this morning.  He stated that he probably swallowed a little bit of bugs he was somewhat nauseated so he took a Zofran that he had and that seems to have helped he has had no actual vomiting.  He does not feel weak or dizzy or short of breath this time.  He states that he thinks the bleeding has slowed but he still tastes some blood in his mouth.      Problem List:    Patient Active Problem List    Diagnosis Date Noted     Chronic tonsillitis 04/17/2018     Priority: Medium     Elevated blood pressure reading without diagnosis of hypertension 08/11/2017     Priority: Medium     Cervicalgia 08/11/2017     Priority: Medium     Tobacco use 08/11/2017     Priority: Medium        Past Medical History:    Past Medical History:   Diagnosis Date     Back pain, chronic      Chronic tonsillitis      Tobacco use        Past Surgical History:    Past Surgical History:   Procedure Laterality Date     BACK SURGERY  02/2016     HC TOOTH EXTRACTION W/FORCEP       TONSILLECTOMY N/A 4/24/2018    Procedure: TONSILLECTOMY;  tonsillectomy;  Surgeon: Joesph Tavares MD;  Location: [facility]       Family History:    Family History   Problem Relation Age of Onset     Hemophilia Brother      younger brother     Colon Cancer No family hx of      Prostate Cancer No family hx of      Anesthesia Reaction No family hx of        Social History:  Marital Status:   [2]  Social History   Substance Use Topics     Smoking status: Former Smoker     Types: Cigarettes      "Smokeless tobacco: Never Used     Alcohol use Yes      Comment: occ        Medications:      amoxicillin (AMOXIL) 400 MG/5ML suspension   magic mouthwash (ENTER INGREDIENTS IN COMMENTS) suspension   ondansetron (ZOFRAN-ODT) 4 MG ODT tab   oxyCODONE-acetaminophen (PERCOCET) 5-325 MG per tablet   DiphenhydrAMINE HCl (BENADRYL PO)         Review of Systems   Constitutional: Negative for activity change and fever.   HENT: Positive for sore throat. Negative for nosebleeds.    Respiratory: Negative for shortness of breath.    Gastrointestinal: Positive for nausea.   Neurological: Negative for dizziness, weakness and light-headedness.   All other systems reviewed and are negative.      Physical Exam   BP: (!) 152/105  Heart Rate: 71  Temp: 97.8  F (36.6  C)  Resp: 18  Height: 188 cm (6' 2\")  Weight: 77.1 kg (170 lb)  SpO2: 98 %      Physical Exam   Constitutional: He is oriented to person, place, and time. He appears well-developed and well-nourished. No distress.   HENT:   Head: Normocephalic and atraumatic.   Mouth/Throat: Oropharynx is clear and moist. No trismus in the jaw.   Patient with significant amount of eschar noted to the posterior pharyngeal area except that to the right tonsillar pillar area.  It appears that this eschar has sloughed from the tonsillar pillars site and now is with some blood clot on the pillar itself but no significant active bleeding identified.  There was no suggestion of significant infection or swelling.  Patient was monitored for several hours without any additional bleeding noted to the site during the ER stay.   Eyes: Conjunctivae and EOM are normal. Pupils are equal, round, and reactive to light.   Neck: Normal range of motion. Neck supple.   Cardiovascular: Normal rate.    No murmur heard.  Pulmonary/Chest: Effort normal. No respiratory distress.   Neurological: He is alert and oriented to person, place, and time.   Skin: Skin is warm. No pallor.   Psychiatric: He has a normal mood " and affect. His behavior is normal. Judgment and thought content normal.   Nursing note and vitals reviewed.      ED Course     ED Course     Procedures               Critical Care time:  none            Medications   atenolol (TENORMIN) half-tab 12.5 mg (12.5 mg Oral Given 4/28/18 0342)       Assessments & Plan (with Medical Decision Making)  28-year-old male to the ER secondary to postop secondary tonsillectomy bleeding.  Appears the bleeding is secondary to loss of the eschar to his right tonsillar pillar area.  He had very little bleeding during the ER stay and was monitored for several hours and appear to be quite stable.  Spent some time with him educating about the last of the eschar and potential bleeding that might occur on the other side when that eschar sloughs as well.  I did give him additional handouts instructing him on postoperative tonsillectomy bleeding things that he can do to help prevent it from becoming severe.  I have asked him to contact Dr. Tavares on Monday to update him on how he is doing.  Encouraged to return to the ER should he have increasing symptoms or bleeding.     I have reviewed the nursing notes.    I have reviewed the findings, diagnosis, plan and need for follow up with the patient.       Discharge Medication List as of 4/28/2018  4:56 AM             I verbally discussed the findings of the evaluation today in the ER. I have verbally discussed with Ezra the suggested treatment(s) as described in the discharge instructions and handouts.    I have verbally suggested he follow-up in his clinic or return to the ER for increased symptoms. See the follow-up recommendations documented  in the after visit summary in this visit's EPIC chart.      Final diagnoses:   Secondary post tonsillectomy hemorrhage - mild right sided       4/28/2018   [facility] EMERGENCY DEPARTMENT     Steve Will,   04/28/18 1926

## 2018-05-10 ENCOUNTER — OFFICE VISIT (OUTPATIENT)
Dept: OTOLARYNGOLOGY | Facility: CLINIC | Age: 28
End: 2018-05-10
Payer: COMMERCIAL

## 2018-05-10 VITALS
HEIGHT: 74 IN | BODY MASS INDEX: 21.82 KG/M2 | DIASTOLIC BLOOD PRESSURE: 99 MMHG | HEART RATE: 72 BPM | OXYGEN SATURATION: 100 % | RESPIRATION RATE: 12 BRPM | SYSTOLIC BLOOD PRESSURE: 154 MMHG | WEIGHT: 170 LBS

## 2018-05-10 DIAGNOSIS — Z98.890 POSTOPERATIVE STATE: Primary | ICD-10-CM

## 2018-05-10 PROCEDURE — 99024 POSTOP FOLLOW-UP VISIT: CPT | Performed by: OTOLARYNGOLOGY

## 2018-05-10 NOTE — LETTER
5/10/2018         RE: Ezra Murguia  [address]  [place] [facility] [address]        Dear Colleague,    Thank you for referring your patient, Ezra Murgiua, to the [facility]. Please see a copy of my visit note below.    [facility] Otolaryngology Post-Op / Progress Note         Assessment and Plan:    Assessment:   Post-operative day #16  Tonsillectomy     Doing well.  Clean wound without signs of infection.  Normal healing wound.  No immediate surgical complications identified.  No excessive bleeding  Pain well-controlled.  Tolerating physical therapy and rehabilitation well.      Plan:   Advance diet as tolerated  Encouraged to drink fluids  Humidified air  Monitor for signs of infection  Pain control measures  Wean off PCA  Saline wash     Discussed Disability paper work.            Interval History:   Doing well.  Continues to improve.  Pain is well-controlled.  No fevers.            Significant Problems:      Patient Active Problem List   Diagnosis     Elevated blood pressure reading without diagnosis of hypertension     Cervicalgia     Tobacco use     Chronic tonsillitis             Review of Systems:    The patient denies any chest pain, shortness of breath, excessive pain, fever, chills, purulent drainage from the wound, nausea or vomiting.    This document serves as a record of the services and decisions personally performed and made by Joesph Tavares MD. It was created on his behalf by Joe Brooks, a trained medical scribe. The creation of this document is based the provider's statements to the medical scribe.  Joe Brooks May 10, 2018 11:04 AM           Medications:     Current Outpatient Prescriptions   Medication Sig     DiphenhydrAMINE HCl (BENADRYL PO) Take 50 mg by mouth     ondansetron (ZOFRAN-ODT) 4 MG ODT tab Take 1-2 tablets (4-8 mg) by mouth every 8 hours as needed for nausea Dissolve ON the tongue.     oxyCODONE-acetaminophen (PERCOCET) 5-325 MG per tablet Take 1-2  "tablets by mouth every 4 hours as needed for pain (moderate to severe)     No current facility-administered medications for this visit.              Physical Exam:   All vitals have been reviewed  Patient Vitals for the past 24 hrs:   BP Pulse Resp SpO2 Height Weight   05/10/18 1028 (!) 154/99 72 12 100 % 1.88 m (6' 2\") 77.1 kg (170 lb)       Wound is clean with minimal or no drainage.      Joesph Tavares MD     The information in this document, created by the medical scribe for me, accurately reflects the services I personally performed and the decisions made by me. I have reviewed and approved this document for accuracy prior to leaving the patient care area.    Joesph aTvares MD.  11:04 AM, 05/10/18      Again, thank you for allowing me to participate in the care of your patient.        Sincerely,        Joesph Tavares MD, MD    "

## 2018-05-10 NOTE — PATIENT INSTRUCTIONS
General Scheduling Information  To schedule your CT/MRI scan, please contact [facility] at [phone] OR [facility] at [phone]    To schedule your Surgery, please contact our Specialty Schedulers at [phone]      ENT Clinic Locations Clinic Hours Telephone Number     [facility]  [address] [facility]  [place] [address]     2nd & 4th Thursday:           8:00am - 12:00pm   To schedule/reschedule an appointment with   Dr. Tavares,   please contact our   Specialty Scheduling Department at:     [phone]       [facility]  [address] [place] [address] [place] [address]   Monday:             8:00am -- 4:30 pm      1st, 3rd & 5th Thursday:           8:00am - 12:00pm      Jason [address] [address]   Wednesday:       9:00 -- 4:30 pm

## 2018-05-10 NOTE — PROGRESS NOTES
[facility] Otolaryngology Post-Op / Progress Note         Assessment and Plan:    Assessment:   Post-operative day #16  Tonsillectomy     Doing well.  Clean wound without signs of infection.  Normal healing wound.  No immediate surgical complications identified.  No excessive bleeding  Pain well-controlled.  Tolerating physical therapy and rehabilitation well.      Plan:   Advance diet as tolerated  Encouraged to drink fluids  Humidified air  Monitor for signs of infection  Pain control measures  Wean off PCA  Saline wash     Discussed Disability paper work.            Interval History:   Doing well.  Continues to improve.  Pain is well-controlled.  No fevers.            Significant Problems:      Patient Active Problem List   Diagnosis     Elevated blood pressure reading without diagnosis of hypertension     Cervicalgia     Tobacco use     Chronic tonsillitis             Review of Systems:    The patient denies any chest pain, shortness of breath, excessive pain, fever, chills, purulent drainage from the wound, nausea or vomiting.    This document serves as a record of the services and decisions personally performed and made by Joesph Tavares MD. It was created on his behalf by Joe Brooks, a trained medical scribe. The creation of this document is based the provider's statements to the medical scribe.  Joe Brooks May 10, 2018 11:04 AM           Medications:     Current Outpatient Prescriptions   Medication Sig     DiphenhydrAMINE HCl (BENADRYL PO) Take 50 mg by mouth     ondansetron (ZOFRAN-ODT) 4 MG ODT tab Take 1-2 tablets (4-8 mg) by mouth every 8 hours as needed for nausea Dissolve ON the tongue.     oxyCODONE-acetaminophen (PERCOCET) 5-325 MG per tablet Take 1-2 tablets by mouth every 4 hours as needed for pain (moderate to severe)     No current facility-administered medications for this visit.              Physical Exam:   All vitals have been reviewed  Patient Vitals for the past 24  "hrs:   BP Pulse Resp SpO2 Height Weight   05/10/18 1028 (!) 154/99 72 12 100 % 1.88 m (6' 2\") 77.1 kg (170 lb)       Wound is clean with minimal or no drainage.      Joesph Tavares MD     The information in this document, created by the medical scribe for me, accurately reflects the services I personally performed and the decisions made by me. I have reviewed and approved this document for accuracy prior to leaving the patient care area.    Joesph Tavares MD.  11:04 AM, 05/10/18    "

## 2018-05-10 NOTE — MR AVS SNAPSHOT
After Visit Summary   5/10/2018    Ezra Murguia    MRN: [id number]           Patient Information     Date Of Birth          1990        Visit Information        Provider Department      5/10/2018 10:30 AM Joesph Tavares MD [place] [facility]        Today's Diagnoses     Postoperative state    -  1      Care Instructions    General Scheduling Information  To schedule your CT/MRI scan, please contact [facility] at [phone] OR [facility] at [phone]    To schedule your Surgery, please contact our Specialty Schedulers at [phone]      ENT Clinic Locations Clinic Hours Telephone Number     [facility]  [address] [facility]  [place] [address]     2nd & 4th Thursday:           8:00am - 12:00pm   To schedule/reschedule an appointment with   Dr. Tavares,   please contact our   Specialty Scheduling Department at:     [phone]       [facility]  [address] [place] [address] [place] [address]   Monday:             8:00am -- 4:30 pm      1st, 3rd & 5th Thursday:           8:00am - 12:00pm      [address] [address]   Wednesday:       9:00 -- 4:30 pm                    Follow-ups after your visit        Who to contact     If you have questions or need follow up information about today's clinic visit or your schedule please contact [facility] directly at [phone].  Normal or non-critical lab and imaging results will be communicated to you by MyChart, letter or phone within 4 business days after the clinic has received the results. If you do not hear from us within 7 days, please contact the clinic through MyChart or phone. If you have a critical or abnormal lab result, we will notify you by phone as soon as possible.  Submit refill requests through [employer] or call your pharmacy and they will forward the refill request to us. Please allow 3 business days for your refill to be completed.          Additional  "Information About Your Visit        MyChart Information     [employer] lets you send messages to your doctor, view your test results, renew your prescriptions, schedule appointments and more. To sign up, go to [email or web address] . Click on \"Log in\" on the left side of the screen, which will take you to the Welcome page. Then click on \"Sign up Now\" on the right side of the page.     You will be asked to enter the access code listed below, as well as some personal information. Please follow the directions to create your username and password.     Your access code is: [email or web address]  Expires: 2018  4:41 PM     Your access code will  in 90 days. If you need help or a new code, please call your [place] clinic or [phone].        Care EveryWhere ID     This is your Care EveryWhere ID. This could be used by other organizations to access your [place] medical records  [id number]        Your Vitals Were     Pulse Respirations Height Pulse Oximetry BMI (Body Mass Index)       72 12 1.88 m (6' 2\") 100% 21.83 kg/m2        Blood Pressure from Last 3 Encounters:   05/10/18 (!) 154/99   18 (!) 132/95   18 136/84    Weight from Last 3 Encounters:   05/10/18 77.1 kg (170 lb)   18 77.1 kg (170 lb)   18 78 kg (172 lb)              Today, you had the following     No orders found for display       Primary Care Provider Office Phone # Fax #    Rachel Lion [phone] [address] [facility] [facility] [address] [place] [address]        Equal Access to Services     [facility] : Hadii og Cook, waminda ludaphneadaha, qaybta kaalmada rony, beverly richardson. So [facility] [phone].    ATENCIÓN: Si habla español, tiene a valdez disposición servicios gratuitos de asistencia lingüística. Llame al [phone].    We comply with applicable federal civil rights laws and [place] laws. We do not discriminate on the basis of race, color, national origin, age, " disability, sex, sexual orientation, or gender identity.            Thank you!     Thank you for choosing [facility]  for your care. Our goal is always to provide you with excellent care. Hearing back from our patients is one way we can continue to improve our services. Please take a few minutes to complete the written survey that you may receive in the mail after your visit with us. Thank you!             Your Updated Medication List - Protect others around you: Learn how to safely use, store and throw away your medicines at www.disposemymeds.org.          This list is accurate as of 5/10/18  5:09 PM.  Always use your most recent med list.                   Brand Name Dispense Instructions for use Diagnosis    BENADRYL PO      Take 50 mg by mouth        ondansetron 4 MG ODT tab    ZOFRAN-ODT    14 tablet    Take 1-2 tablets (4-8 mg) by mouth every 8 hours as needed for nausea Dissolve ON the tongue.    Post-operative nausea and vomiting       oxyCODONE-acetaminophen 5-325 MG per tablet    PERCOCET    60 tablet    Take 1-2 tablets by mouth every 4 hours as needed for pain (moderate to severe)    Post-op pain

## 2018-05-16 ENCOUNTER — TELEPHONE (OUTPATIENT)
Dept: OTOLARYNGOLOGY | Facility: CLINIC | Age: 28
End: 2018-05-16

## 2018-05-16 NOTE — TELEPHONE ENCOUNTER
Alessandro for pt to call [phone] and let us know if he can drop off the forms again to Kenyetta GAGE RN in specialty either today or tomorrow. We also need to know when he calls back the dates of work he has missed and days he will be missing. Thank You Padma

## 2018-05-16 NOTE — TELEPHONE ENCOUNTER
Please see message below.     Out of work from April 24th to Wednesday May 9th.  Patient will drop forms today.   Thank you,  Sue.

## 2018-05-16 NOTE — TELEPHONE ENCOUNTER
Our goal is to have forms completed with 72 hours, however some forms may require a visit or additional information.    Who is the form from?: Patient  Where the form came from: Patient or family brought in     What clinic location was the form placed at?: [place]  Where the form was placed: 'dagmar Warner  What number is listed as a contact on the form?: [phone]    Phone call message- patient request for a letter, form or note:    Date needed: as soon as possible  Please call the patient when completed and fax to [phone] and - [phone]  Has the patient signed a consent form for release of information? YES    Additional comments: asap     Call taken on 5/16/2018 at 4:00 PM by Sue Garcia    Type of letter, form or note: MARIBEL

## 2018-05-16 NOTE — TELEPHONE ENCOUNTER
ALISA called and they do not have any forms for pt. Would MD have brought them to the clinic? CHERRY Dhillon

## 2018-05-16 NOTE — PATIENT INSTRUCTIONS
Reason for Call:  Other SHORT TERM DISABILITY PAPERWORK    Detailed comments: Patient is looking to get his short term disability paperwork. Spoke to Dr. Tavares last week, was told it was suppose to be ready by Monday. Has not heard anything. Please call him.    Phone Number Patient can be reached at: Home number on file [phone] (home)    Best Time: ba    Can we leave a detailed message on this number? YES    Call taken on 5/16/2018 at 9:03 AM by Aileen Sy

## 2018-05-16 NOTE — TELEPHONE ENCOUNTER
Forms were given to ALISA on the day of his surgery, Charla will look for these forms tomorrow, pt will need to pick these upThank You Padma

## 2018-05-17 NOTE — TELEPHONE ENCOUNTER
[employer] form completed, sent to scanning and faxed to two different numbers: [employer] and [employer] benefits([phone]).CHERRY Dhillon

## 2019-03-05 ENCOUNTER — OFFICE VISIT (OUTPATIENT)
Dept: FAMILY MEDICINE | Facility: CLINIC | Age: 29
End: 2019-03-05
Payer: COMMERCIAL

## 2019-03-05 VITALS
SYSTOLIC BLOOD PRESSURE: 138 MMHG | WEIGHT: 186 LBS | HEART RATE: 89 BPM | HEIGHT: 74 IN | OXYGEN SATURATION: 100 % | TEMPERATURE: 97.5 F | BODY MASS INDEX: 23.87 KG/M2 | DIASTOLIC BLOOD PRESSURE: 92 MMHG

## 2019-03-05 DIAGNOSIS — Z23 NEED FOR PROPHYLACTIC VACCINATION AND INOCULATION AGAINST INFLUENZA: ICD-10-CM

## 2019-03-05 DIAGNOSIS — B37.0 ORAL THRUSH: Primary | ICD-10-CM

## 2019-03-05 DIAGNOSIS — R03.0 ELEVATED BLOOD PRESSURE READING WITHOUT DIAGNOSIS OF HYPERTENSION: ICD-10-CM

## 2019-03-05 DIAGNOSIS — Z23 NEED FOR VACCINATION: ICD-10-CM

## 2019-03-05 PROCEDURE — 99213 OFFICE O/P EST LOW 20 MIN: CPT | Mod: 25 | Performed by: FAMILY MEDICINE

## 2019-03-05 PROCEDURE — 90715 TDAP VACCINE 7 YRS/> IM: CPT | Performed by: FAMILY MEDICINE

## 2019-03-05 PROCEDURE — 90471 IMMUNIZATION ADMIN: CPT | Performed by: FAMILY MEDICINE

## 2019-03-05 PROCEDURE — 90472 IMMUNIZATION ADMIN EACH ADD: CPT | Performed by: FAMILY MEDICINE

## 2019-03-05 PROCEDURE — 90686 IIV4 VACC NO PRSV 0.5 ML IM: CPT | Performed by: FAMILY MEDICINE

## 2019-03-05 RX ORDER — NYSTATIN 100000/ML
500000 SUSPENSION, ORAL (FINAL DOSE FORM) ORAL 3 TIMES DAILY
Qty: 60 ML | Refills: 1 | Status: SHIPPED | OUTPATIENT
Start: 2019-03-05 | End: 2019-03-12

## 2019-03-05 ASSESSMENT — MIFFLIN-ST. JEOR: SCORE: 1883.44

## 2019-03-05 NOTE — PROGRESS NOTES
SUBJECTIVE:   Ezra Murguia is a 29 year old male who presents to clinic today for the following health issues:      Mouth/ Tongue Problem- concern about oral thrush  White discoloration of tongue with some swelling- intermittent  Feels like food gets stuck to his tongue and has a cotton-feeling in his mouth.   Noticed dryness of mouth when he does have the white coating.   Does not feel this is in relation to food.    No recent antibiotic use.  Ex-smoker.   Reports that he has tried to brush his teeth and tongue.   Recently had a tonsillectomy- now healed.    Blood pressure is elevated in the clinic today- no known history of hypertension.   Denies having any chest pain, shortness of breath or dyspnea on exertion.     HEALTH CARE MAINTENANCE: Due for Tdap and Flu shot.     Problem list and histories reviewed & adjusted, as indicated.  Additional history: as documented    Patient Active Problem List   Diagnosis     Elevated blood pressure reading without diagnosis of hypertension     Cervicalgia     Tobacco use     Chronic tonsillitis     Past Surgical History:   Procedure Laterality Date     BACK SURGERY  02/2016     HC TOOTH EXTRACTION W/FORCEP       TONSILLECTOMY N/A 4/24/2018    Procedure: TONSILLECTOMY;  tonsillectomy;  Surgeon: Joesph Tavares MD;  Location:  OR       Social History     Tobacco Use     Smoking status: Former Smoker     Types: Cigarettes     Smokeless tobacco: Never Used   Substance Use Topics     Alcohol use: Yes     Comment: occ     Family History   Problem Relation Age of Onset     Hemophilia Brother         younger brother     Colon Cancer No family hx of      Prostate Cancer No family hx of      Anesthesia Reaction No family hx of          Current Outpatient Medications   Medication Sig Dispense Refill     nystatin (MYCOSTATIN) 656133 UNIT/ML suspension Take 5 mLs (500,000 Units) by mouth 3 times daily for 7 days Swish and spit 60 mL 1     DiphenhydrAMINE HCl (BENADRYL PO) Take 50 mg  "by mouth       No Known Allergies    Reviewed and updated as needed this visit by clinical staff  Tobacco  Allergies  Meds       Reviewed and updated as needed this visit by Provider  Tobacco         ROS:  Constitutional, HEENT, cardiovascular, pulmonary, gi and gu systems are negative, except as otherwise noted.    OBJECTIVE:     BP (!) 138/92   Pulse 89   Temp 97.5  F (36.4  C) (Tympanic)   Ht 1.88 m (6' 2\")   Wt 84.4 kg (186 lb)   SpO2 100%   BMI 23.88 kg/m    Body mass index is 23.88 kg/m .  GENERAL: healthy, alert and no distress  HENT: ear canals and TM's normal, nose  Normal, white coating on the tongue but no adherent white plaques on the buccal mucosa.   RESP: lungs clear to auscultation - no rales, rhonchi or wheezes  CV: regular rate and rhythm, normal S1 S2, no S3 or S4, no murmur, click or rub, no peripheral edema and peripheral pulses strong    Diagnostic Test Results:  none     ASSESSMENT/PLAN:     Ezra was seen today for mouth/lip problem.    Diagnoses and all orders for this visit:    White coated tongue- ? Oral thrush  -     Trial: nystatin (MYCOSTATIN) [id number] UNIT/ML suspension; Take 5 mLs (500,000 Units) by mouth 3 times daily for 7 days Swish and spit    Elevated blood pressure reading without diagnosis of hypertension  Repeat BP at the end of the visit was still elevated above goal.   Recommended to repeat BP in 2 weeks (Ancillary) if BP is persistently elevated > 140/90, follow up with me for hypertension treatment. Patient verbalized understanding.    Need for prophylactic vaccination and inoculation against influenza  -     FLU VACCINE, SPLIT VIRUS, IM (QUADRIVALENT) [02725]- >3 YRS  -     ADMIN 1st VACCINE    Need for vaccination  -     TDAP VACCINE (ADACEL)  -     VACCINE ADMINISTRATION, EACH ADDITIONAL       Follow up if symptoms fail to improve in 2 weeks or worsen.      The patient was in agreement with the plan today and had no questions or concerns prior to leaving the " clinic.        Rachel Lion MD  [facility]    Injectable Influenza Immunization Documentation    1.  Is the person to be vaccinated sick today?   No    2. Does the person to be vaccinated have an allergy to a component   of the vaccine?   No  Egg Allergy Algorithm Link    3. Has the person to be vaccinated ever had a serious reaction   to influenza vaccine in the past?   No    4. Has the person to be vaccinated ever had Guillain-Barré syndrome?   No    Form completed by Ebonie Hutchison MA      Screening Questionnaire for Adult Immunization    Are you sick today?   No   Do you have allergies to medications, food, a vaccine component or latex?   No   Have you ever had a serious reaction after receiving a vaccination?   No   Do you have a long-term health problem with heart disease, lung disease, asthma, kidney disease, metabolic disease (e.g. diabetes), anemia, or other blood disorder?   No   Do you have cancer, leukemia, HIV/AIDS, or any other immune system problem?   No   In the past 3 months, have you taken medications that affect  your immune system, such as prednisone, other steroids, or anticancer drugs; drugs for the treatment of rheumatoid arthritis, Crohn s disease, or psoriasis; or have you had radiation treatments?   No   Have you had a seizure, or a brain or other nervous system problem?   No   During the past year, have you received a transfusion of blood or blood     products, or been given immune (gamma) globulin or antiviral drug?   No   For women: Are you pregnant or is there a chance you could become        pregnant during the next month?   No   Have you received any vaccinations in the past 4 weeks?   No     Immunization questionnaire answers were all negative.        Per orders of Dr. Lion, injection of tdap given by Ebonie Hutchison. Patient instructed to remain in clinic for 15 minutes afterwards, and to report any adverse reaction to me immediately.       Screening  performed by Ebonie Hutchison on 3/5/2019 at 5:08 PM.

## 2019-03-19 ENCOUNTER — ALLIED HEALTH/NURSE VISIT (OUTPATIENT)
Dept: NURSING | Facility: CLINIC | Age: 29
End: 2019-03-19
Payer: COMMERCIAL

## 2019-03-19 VITALS — SYSTOLIC BLOOD PRESSURE: 130 MMHG | HEART RATE: 80 BPM | DIASTOLIC BLOOD PRESSURE: 80 MMHG

## 2019-03-19 DIAGNOSIS — Z23 ENCOUNTER FOR IMMUNIZATION: Primary | ICD-10-CM

## 2019-03-19 PROCEDURE — 99207 ZZC NO CHARGE NURSE ONLY: CPT

## 2019-03-19 PROCEDURE — 90636 HEP A/HEP B VACC ADULT IM: CPT

## 2019-03-19 PROCEDURE — 90471 IMMUNIZATION ADMIN: CPT

## 2019-03-19 NOTE — PROGRESS NOTES
Ezra Murguia is a 29 year old patient who comes in today for a Blood Pressure check.  Initial BP:  /80   Pulse 80        Disposition: results routed to provider

## 2019-03-19 NOTE — NURSING NOTE
Screening Questionnaire for Adult Immunization    Are you sick today?   No   Do you have allergies to medications, food, a vaccine component or latex?   No   Have you ever had a serious reaction after receiving a vaccination?   No   Do you have a long-term health problem with heart disease, lung disease, asthma, kidney disease, metabolic disease (e.g. diabetes), anemia, or other blood disorder?   No   Do you have cancer, leukemia, HIV/AIDS, or any other immune system problem?   No   In the past 3 months, have you taken medications that affect  your immune system, such as prednisone, other steroids, or anticancer drugs; drugs for the treatment of rheumatoid arthritis, Crohn s disease, or psoriasis; or have you had radiation treatments?   No   Have you had a seizure, or a brain or other nervous system problem?   No   During the past year, have you received a transfusion of blood or blood     products, or been given immune (gamma) globulin or antiviral drug?   No   For women: Are you pregnant or is there a chance you could become        pregnant during the next month?   No   Have you received any vaccinations in the past 4 weeks?   No     Immunization questionnaire answers were all negative.        Per orders of Dr. Lion, injection of twinrix given by Karen Stahl. Patient instructed to remain in clinic for 15 minutes afterwards, and to report any adverse reaction to me immediately.       Screening performed by Karen Stahl on 3/19/2019 at 9:28 AM.

## (undated) DEVICE — PACK T & A CUSTOM

## (undated) DEVICE — GLOVE PROTEXIS W/NEU-THERA 8.0  2D73TE80

## (undated) DEVICE — ESU COBLATOR II WAND 70DEG XTRA ULTRA EIC5872-01

## (undated) DEVICE — SOL NACL 0.9% INJ 1000ML BAG 07983-09

## (undated) DEVICE — SOL NACL 0.9% IRRIG 1000ML BOTTLE 07138-09

## (undated) DEVICE — SPONGE RAY-TEC 4X8" 7318

## (undated) RX ORDER — LIDOCAINE HYDROCHLORIDE 10 MG/ML
INJECTION, SOLUTION EPIDURAL; INFILTRATION; INTRACAUDAL; PERINEURAL
Status: DISPENSED
Start: 2018-04-24

## (undated) RX ORDER — FENTANYL CITRATE 50 UG/ML
INJECTION, SOLUTION INTRAMUSCULAR; INTRAVENOUS
Status: DISPENSED
Start: 2018-04-24

## (undated) RX ORDER — BUPIVACAINE HYDROCHLORIDE 2.5 MG/ML
INJECTION, SOLUTION EPIDURAL; INFILTRATION; INTRACAUDAL
Status: DISPENSED
Start: 2018-04-24

## (undated) RX ORDER — MEPERIDINE HYDROCHLORIDE 25 MG/ML
INJECTION INTRAMUSCULAR; INTRAVENOUS; SUBCUTANEOUS
Status: DISPENSED
Start: 2018-04-24

## (undated) RX ORDER — PROPOFOL 10 MG/ML
INJECTION, EMULSION INTRAVENOUS
Status: DISPENSED
Start: 2018-04-24

## (undated) RX ORDER — DEXAMETHASONE SODIUM PHOSPHATE 10 MG/ML
INJECTION INTRAMUSCULAR; INTRAVENOUS
Status: DISPENSED
Start: 2018-04-24

## (undated) RX ORDER — ONDANSETRON 2 MG/ML
INJECTION INTRAMUSCULAR; INTRAVENOUS
Status: DISPENSED
Start: 2018-04-24

## (undated) RX ORDER — LIDOCAINE HYDROCHLORIDE 20 MG/ML
INJECTION, SOLUTION EPIDURAL; INFILTRATION; INTRACAUDAL; PERINEURAL
Status: DISPENSED
Start: 2018-04-24